# Patient Record
Sex: FEMALE | Race: WHITE | HISPANIC OR LATINO | Employment: UNEMPLOYED | URBAN - METROPOLITAN AREA
[De-identification: names, ages, dates, MRNs, and addresses within clinical notes are randomized per-mention and may not be internally consistent; named-entity substitution may affect disease eponyms.]

---

## 2018-04-16 PROBLEM — K14.8 TONGUE MASS: Status: ACTIVE | Noted: 2018-04-16

## 2018-04-16 PROBLEM — M25.472 EDEMA OF BOTH ANKLES: Status: ACTIVE | Noted: 2018-04-16

## 2018-04-16 PROBLEM — M25.471 EDEMA OF BOTH ANKLES: Status: ACTIVE | Noted: 2018-04-16

## 2018-04-16 PROBLEM — Z00.00 ENCOUNTER FOR PREVENTIVE CARE: Status: ACTIVE | Noted: 2018-04-16

## 2018-04-16 PROBLEM — K31.84 GASTROPARESIS: Status: ACTIVE | Noted: 2018-04-16

## 2018-04-16 PROBLEM — J45.20 MILD INTERMITTENT ASTHMA WITHOUT COMPLICATION: Status: ACTIVE | Noted: 2018-04-16

## 2018-06-11 ENCOUNTER — OFFICE VISIT (OUTPATIENT)
Dept: URGENT CARE | Age: 38
End: 2018-06-11
Payer: COMMERCIAL

## 2018-06-11 VITALS
SYSTOLIC BLOOD PRESSURE: 117 MMHG | WEIGHT: 154 LBS | HEART RATE: 76 BPM | OXYGEN SATURATION: 100 % | RESPIRATION RATE: 18 BRPM | BODY MASS INDEX: 26.29 KG/M2 | HEIGHT: 64 IN | DIASTOLIC BLOOD PRESSURE: 72 MMHG | TEMPERATURE: 97.9 F

## 2018-06-11 DIAGNOSIS — J45.909 MODERATE ASTHMA, UNSPECIFIED WHETHER COMPLICATED, UNSPECIFIED WHETHER PERSISTENT: Primary | ICD-10-CM

## 2018-06-11 DIAGNOSIS — S93.402A SPRAIN OF LEFT ANKLE, UNSPECIFIED LIGAMENT, INITIAL ENCOUNTER: ICD-10-CM

## 2018-06-11 DIAGNOSIS — J45.901 ASTHMA WITH ACUTE EXACERBATION, UNSPECIFIED ASTHMA SEVERITY, UNSPECIFIED WHETHER PERSISTENT: Primary | ICD-10-CM

## 2018-06-11 PROCEDURE — 99203 OFFICE O/P NEW LOW 30 MIN: CPT | Performed by: PHYSICIAN ASSISTANT

## 2018-06-11 PROCEDURE — S9088 SERVICES PROVIDED IN URGENT: HCPCS | Performed by: PHYSICIAN ASSISTANT

## 2018-06-11 RX ORDER — PREDNISONE 10 MG/1
20 TABLET ORAL 2 TIMES DAILY WITH MEALS
Qty: 20 TABLET | Refills: 0 | Status: SHIPPED | OUTPATIENT
Start: 2018-06-11 | End: 2018-06-16

## 2018-06-11 RX ORDER — ALBUTEROL SULFATE 90 UG/1
2 AEROSOL, METERED RESPIRATORY (INHALATION) EVERY 6 HOURS PRN
Qty: 18 G | Refills: 0 | Status: SHIPPED | OUTPATIENT
Start: 2018-06-11

## 2018-06-11 NOTE — PATIENT INSTRUCTIONS
Take prednisone as prescribed  Use albuterol inhaler every 4 hours  Tylenol 500mg over the counter for pain  Ice, rest and elevation of L ankle  Follow up with orthopedic specialist  Follow up with PCP in 3-5 days  Proceed to  ER if symptoms worsen  Bronchospasm   WHAT YOU NEED TO KNOW:   Bronchospasm is a narrowing of the airway that usually comes and goes  You may be at risk for bronchospasm if you have a chest cold or allergies  You may also be at risk if you are bothered by air pollution, certain medicines, cold, dry air, smoke, or strong odors  Exercise may worsen your symptoms  Bronchospasms may make it hard for you to breathe  DISCHARGE INSTRUCTIONS:   Medicines: You may need any of the following:  · Bronchodilators  help expand your airway for easier breathing  Some of these medicines may help prevent future spasms  · Inhaled steroids  help reduce swelling in your airway and soothe your breathing  These are used for long-term control  · Anticholinergics  help relax and open your airway  · Take your medicine as directed  Contact your healthcare provider if you think your medicine is not helping or if you have side effects  Tell him of her if you are allergic to any medicine  Keep a list of the medicines, vitamins, and herbs you take  Include the amounts, and when and why you take them  Bring the list or the pill bottles to follow-up visits  Carry your medicine list with you in case of an emergency  Follow up with your healthcare provider as directed: You may need more tests to find the cause of your condition  Write down your questions so you remember to ask them during your visits  Self-care:   · Avoid triggers  · Warm up before you exercise  Ask your healthcare provider about the best exercise plan for you  · Try to avoid people who are sick  Ask your healthcare provider if you need a flu or pneumonia vaccine       · Breathe through your nose when you are in cold, dry air or weather  This may help reduce lung irritation by warming the air before it reaches your lungs  Contact your healthcare provider if:   · You have a fever  · You have a cough that will not go away  · Your wheezing worsens  · You have questions or concerns about your condition or care  Return to the emergency department if:   · You cough or spit up blood  · You have trouble breathing  · You have blue fingernails or toenails  · You have chest pain  · You have a fast or uneven heartbeat  © 2017 [employer]  Information is for End User's use only and may not be sold, redistributed or otherwise used for commercial purposes  All illustrations and images included in CareNotes® are the copyrighted property of A [employer]  or Gabe Smyth  The above information is an  only  It is not intended as medical advice for individual conditions or treatments  Talk to your doctor, nurse or pharmacist before following any medical regimen to see if it is safe and effective for you

## 2018-06-11 NOTE — PROGRESS NOTES
[facility]        NAME: Marcelo Hickman is a 30 y o  female  : 1980    MRN: [id number]  DATE: 2018  TIME: 3:26 PM    Assessment and Plan   Asthma with acute exacerbation, unspecified asthma severity, unspecified whether persistent [J45 901]  1  Asthma with acute exacerbation, unspecified asthma severity, unspecified whether persistent  albuterol (PROAIR HFA) 90 mcg/act inhaler    predniSONE 10 mg tablet   2  Sprain of left ankle, unspecified ligament, initial encounter       ACE wrap applied to L ankle in office  Patient was offered but declined an x-ray, stating she recently received one without evidence of fracture  Patient was instructed to follow up with orthopedic specialist given length of injury  Wells score 0  Patient Instructions     Take prednisone as prescribed  Tylenol 500mg over the counter  Follow up with orthopedic specialist  Use albuterol inhaler every 4 hours  Follow up with PCP in 3-5 days  Proceed to  ER if symptoms worsen  Chief Complaint     Chief Complaint   Patient presents with    Shortness of Breath     Pt reports feeling SOB since yesterday  Pt has hx of asthma  Pt called PCP but only rec'd answering machine and no one called her back  History of Present Illness       States that her PCP was supposed to prescribe an inhaler 2 weeks ago but hasn't  Previous hospitalization for asthma "so long ago that I can't remember, maybe 6 years ago"  Admits to additional L ankle pain x 3 years  States she has had multiple x-rays (last=3 months ago) and ultrasounds without fracture or evidence of blood clot  States the pain is worse with walking and she doesn't feel like her primary care doctor has been following up as closely as she would prefer  Shortness of Breath   This is a new problem  The current episode started yesterday (Began 2 weeks ago with worsening yesterday  )  The problem occurs constantly  The problem has been gradually worsening  Associated symptoms include a sore throat and wheezing  Pertinent negatives include no abdominal pain, chest pain, claudication, coryza, ear pain, fever, headaches, hemoptysis, leg pain, leg swelling, neck pain, orthopnea, PND, rash, rhinorrhea, sputum production, swollen glands, syncope or vomiting  Nothing aggravates the symptoms  Associated symptoms comments: Chills    The patient has no known risk factors for DVT/PE  She has tried nothing for the symptoms  The treatment provided no relief  Her past medical history is significant for asthma  There is no history of allergies, aspirin allergies, bronchiolitis, CAD, chronic lung disease, COPD, DVT, a heart failure, PE, pneumonia or a recent surgery  Review of Systems   Review of Systems   Constitutional: Negative for activity change, appetite change, chills, fatigue and fever  HENT: Positive for sore throat  Negative for congestion, ear discharge, ear pain, postnasal drip, rhinorrhea, sinus pain, sinus pressure, sneezing and trouble swallowing  Eyes: Negative for discharge, redness and itching  Respiratory: Positive for cough, shortness of breath and wheezing  Negative for hemoptysis, sputum production and chest tightness  Cardiovascular: Negative for chest pain, palpitations, orthopnea, claudication, leg swelling, syncope and PND  Gastrointestinal: Negative for abdominal pain, blood in stool, diarrhea, nausea and vomiting  Genitourinary: Negative for dysuria, flank pain, frequency and urgency  Musculoskeletal: Negative for myalgias and neck pain  Skin: Negative for rash  Neurological: Negative for dizziness, light-headedness and headaches           Current Medications       Current Outpatient Prescriptions:     albuterol (PROAIR HFA) 90 mcg/act inhaler, Inhale 2 puffs every 6 (six) hours as needed for wheezing, Disp: 18 g, Rfl: 0    albuterol (PROVENTIL HFA,VENTOLIN HFA) 90 mcg/act inhaler, Inhale 2 puffs every 6 (six) hours as needed for wheezing, Disp: , Rfl:     metoclopramide (REGLAN) 5 mg tablet, Take 1 tablet (5 mg total) by mouth 4 (four) times a day, Disp: 30 tablet, Rfl: 5    predniSONE 10 mg tablet, Take 2 tablets (20 mg total) by mouth 2 (two) times a day with meals for 5 days, Disp: 20 tablet, Rfl: 0    Current Allergies     Allergies as of 06/11/2018    (No Known Allergies)            The following portions of the patient's history were reviewed and updated as appropriate: allergies, current medications, past family history, past medical history, past social history, past surgical history and problem list      Past Medical History:   Diagnosis Date    Asthma        Past Surgical History:   Procedure Laterality Date    TUBAL LIGATION  05/13/2012       Family History   Problem Relation Age of Onset    Heart disease Mother     Heart disease Father     Diabetes Paternal Grandmother     Diabetes Maternal Uncle          Medications have been verified  Objective   /72   Pulse 76   Temp 97 9 °F (36 6 °C)   Resp 18   Ht 5' 4" (1 626 m)   Wt 69 9 kg (154 lb)   SpO2 100%   BMI 26 43 kg/m²        Physical Exam     Physical Exam   Constitutional: She is oriented to person, place, and time  She appears well-developed and well-nourished  No distress  HENT:   Head: Normocephalic and atraumatic  Right Ear: External ear normal    Left Ear: External ear normal    Mouth/Throat: Oropharynx is clear and moist  No oropharyngeal exudate  Eyes: Conjunctivae are normal  Right eye exhibits no discharge  Left eye exhibits no discharge  Cardiovascular: Normal rate, regular rhythm, normal heart sounds and intact distal pulses  Exam reveals no gallop and no friction rub  No murmur heard  Pulmonary/Chest: Effort normal and breath sounds normal  No respiratory distress  She has no wheezes  She has no rales  She exhibits no tenderness  Abdominal: Soft  There is no tenderness  Musculoskeletal: Normal range of motion  Tenderness to palpation anterior to L lateral malleolus with mild swelling  Pain with L ankle eversion  MS 5/5 LE bilaterally  Lymphadenopathy:     She has no cervical adenopathy  Neurological: She is alert and oriented to person, place, and time  Light touch sensation intact  Skin: Skin is warm  No rash noted  Psychiatric: She has a normal mood and affect  Her behavior is normal  Judgment and thought content normal    Vitals reviewed

## 2018-06-12 RX ORDER — ALBUTEROL SULFATE 90 UG/1
2 AEROSOL, METERED RESPIRATORY (INHALATION) EVERY 6 HOURS PRN
Qty: 18 G | Refills: 5 | OUTPATIENT
Start: 2018-06-12

## 2018-08-01 ENCOUNTER — OFFICE VISIT (OUTPATIENT)
Dept: FAMILY MEDICINE CLINIC | Facility: CLINIC | Age: 38
End: 2018-08-01
Payer: COMMERCIAL

## 2018-08-01 VITALS
TEMPERATURE: 98.1 F | DIASTOLIC BLOOD PRESSURE: 68 MMHG | HEART RATE: 86 BPM | WEIGHT: 148 LBS | OXYGEN SATURATION: 97 % | HEIGHT: 64 IN | BODY MASS INDEX: 25.27 KG/M2 | SYSTOLIC BLOOD PRESSURE: 110 MMHG

## 2018-08-01 DIAGNOSIS — R10.13 EPIGASTRIC PAIN: Primary | ICD-10-CM

## 2018-08-01 DIAGNOSIS — L30.4 INTERTRIGO: ICD-10-CM

## 2018-08-01 DIAGNOSIS — K31.84 GASTROPARESIS: ICD-10-CM

## 2018-08-01 DIAGNOSIS — H69.82 DYSFUNCTION OF LEFT EUSTACHIAN TUBE: ICD-10-CM

## 2018-08-01 PROBLEM — H69.92 DYSFUNCTION OF LEFT EUSTACHIAN TUBE: Status: ACTIVE | Noted: 2018-08-01

## 2018-08-01 LAB
ALBUMIN SERPL BCP-MCNC: 4 G/DL (ref 3.5–5)
ALP SERPL-CCNC: 87 U/L (ref 46–116)
ALT SERPL W P-5'-P-CCNC: 18 U/L (ref 12–78)
ANION GAP SERPL CALCULATED.3IONS-SCNC: 7 MMOL/L (ref 4–13)
AST SERPL W P-5'-P-CCNC: 11 U/L (ref 5–45)
BASOPHILS # BLD AUTO: 0.02 THOUSANDS/ΜL (ref 0–0.1)
BASOPHILS NFR BLD AUTO: 0 % (ref 0–1)
BILIRUB SERPL-MCNC: 0.46 MG/DL (ref 0.2–1)
BUN SERPL-MCNC: 12 MG/DL (ref 5–25)
CALCIUM SERPL-MCNC: 9.4 MG/DL (ref 8.3–10.1)
CHLORIDE SERPL-SCNC: 105 MMOL/L (ref 100–108)
CO2 SERPL-SCNC: 26 MMOL/L (ref 21–32)
CREAT SERPL-MCNC: 0.84 MG/DL (ref 0.6–1.3)
EOSINOPHIL # BLD AUTO: 0.04 THOUSAND/ΜL (ref 0–0.61)
EOSINOPHIL NFR BLD AUTO: 1 % (ref 0–6)
ERYTHROCYTE [DISTWIDTH] IN BLOOD BY AUTOMATED COUNT: 11.9 % (ref 11.6–15.1)
GFR SERPL CREATININE-BSD FRML MDRD: 90 ML/MIN/1.73SQ M
GLUCOSE SERPL-MCNC: 82 MG/DL (ref 65–140)
HCT VFR BLD AUTO: 41.5 % (ref 34.8–46.1)
HGB BLD-MCNC: 13.9 G/DL (ref 11.5–15.4)
IMM GRANULOCYTES # BLD AUTO: 0.01 THOUSAND/UL (ref 0–0.2)
IMM GRANULOCYTES NFR BLD AUTO: 0 % (ref 0–2)
LIPASE SERPL-CCNC: 160 U/L (ref 73–393)
LYMPHOCYTES # BLD AUTO: 2.24 THOUSANDS/ΜL (ref 0.6–4.47)
LYMPHOCYTES NFR BLD AUTO: 38 % (ref 14–44)
MCH RBC QN AUTO: 29.1 PG (ref 26.8–34.3)
MCHC RBC AUTO-ENTMCNC: 33.5 G/DL (ref 31.4–37.4)
MCV RBC AUTO: 87 FL (ref 82–98)
MONOCYTES # BLD AUTO: 0.43 THOUSAND/ΜL (ref 0.17–1.22)
MONOCYTES NFR BLD AUTO: 7 % (ref 4–12)
NEUTROPHILS # BLD AUTO: 3.15 THOUSANDS/ΜL (ref 1.85–7.62)
NEUTS SEG NFR BLD AUTO: 54 % (ref 43–75)
NRBC BLD AUTO-RTO: 0 /100 WBCS
PLATELET # BLD AUTO: 268 THOUSANDS/UL (ref 149–390)
PMV BLD AUTO: 11 FL (ref 8.9–12.7)
POTASSIUM SERPL-SCNC: 3.9 MMOL/L (ref 3.5–5.3)
PROT SERPL-MCNC: 7.6 G/DL (ref 6.4–8.2)
RBC # BLD AUTO: 4.78 MILLION/UL (ref 3.81–5.12)
SODIUM SERPL-SCNC: 138 MMOL/L (ref 136–145)
TSH SERPL DL<=0.05 MIU/L-ACNC: 2.45 UIU/ML (ref 0.36–3.74)
WBC # BLD AUTO: 5.89 THOUSAND/UL (ref 4.31–10.16)

## 2018-08-01 PROCEDURE — 85025 COMPLETE CBC W/AUTO DIFF WBC: CPT | Performed by: PHYSICIAN ASSISTANT

## 2018-08-01 PROCEDURE — 99214 OFFICE O/P EST MOD 30 MIN: CPT | Performed by: PHYSICIAN ASSISTANT

## 2018-08-01 PROCEDURE — 84443 ASSAY THYROID STIM HORMONE: CPT | Performed by: PHYSICIAN ASSISTANT

## 2018-08-01 PROCEDURE — 83690 ASSAY OF LIPASE: CPT | Performed by: PHYSICIAN ASSISTANT

## 2018-08-01 PROCEDURE — 36415 COLL VENOUS BLD VENIPUNCTURE: CPT | Performed by: PHYSICIAN ASSISTANT

## 2018-08-01 PROCEDURE — 80053 COMPREHEN METABOLIC PANEL: CPT | Performed by: PHYSICIAN ASSISTANT

## 2018-08-01 RX ORDER — METOCLOPRAMIDE 5 MG/1
5 TABLET ORAL 4 TIMES DAILY
Qty: 120 TABLET | Refills: 5 | Status: SHIPPED | OUTPATIENT
Start: 2018-08-01 | End: 2019-09-17

## 2018-08-01 RX ORDER — FLUTICASONE PROPIONATE 50 MCG
1 SPRAY, SUSPENSION (ML) NASAL DAILY
Qty: 16 G | Refills: 5 | Status: SHIPPED | OUTPATIENT
Start: 2018-08-01 | End: 2018-09-20 | Stop reason: SINTOL

## 2018-08-01 RX ORDER — KETOCONAZOLE 20 MG/G
CREAM TOPICAL DAILY
Qty: 15 G | Refills: 3 | Status: SHIPPED | OUTPATIENT
Start: 2018-08-01

## 2018-08-01 NOTE — PROGRESS NOTES
Assessment/Plan:    Gastroparesis  Start reglan QID 15 minutes prior to meals  Instructed to eat small meals low in fat and fiber    Epigastric pain  Likely 2/2 gastroparesis  Will check CBC, CMP, lipase, TSH    Intertrigo  Ketoconazole cream prescribed    Dysfunction of left eustachian tube  flonase prescribed one spray per nostril daily       Diagnoses and all orders for this visit:    Epigastric pain  -     CBC and differential  -     Comprehensive metabolic panel  -     TSH, 3rd generation with Free T4 reflex  -     Lipase    Gastroparesis  -     metoclopramide (REGLAN) 5 mg tablet; Take 1 tablet (5 mg total) by mouth 4 (four) times a day    Dysfunction of left eustachian tube  -     fluticasone (FLONASE) 50 mcg/act nasal spray; 1 spray into each nostril daily    Intertrigo  -     ketoconazole (NIZORAL) 2 % cream; Apply topically daily          Subjective:      Patient ID: Gay Baptiste is a 30 y o  female  31 y/o F hx gastroparesis presents c/o abdominal pain, n/v for several days as well as L ear pressure and rash  Abdominal sx similar to gastroparesis in the past  reglan was rxed at last visit but she reports pharmacy never received it  Pt did not have labs performed that I had ordered after last visit  No fever, chills, weight loss  Pt has also been experiencing L ear pressure  No fever, chills, decreased hearing, dizziness, tinnitus, nasal congestion  Did have white spots on the throat but these resolved  Also has rash under breasts that is pruritic        The following portions of the patient's history were reviewed and updated as appropriate: allergies, current medications, past family history, past medical history, past social history, past surgical history and problem list     Review of Systems   Constitutional: Negative for diaphoresis, fatigue and fever  HENT: Positive for ear pain  Negative for congestion, hearing loss, postnasal drip, rhinorrhea and sore throat      Eyes: Negative for pain, redness and visual disturbance  Respiratory: Negative for cough, shortness of breath and wheezing  Cardiovascular: Negative for chest pain, palpitations and leg swelling  Gastrointestinal: Positive for abdominal pain, nausea and vomiting  Negative for anal bleeding, constipation and diarrhea  Endocrine: Negative for polydipsia and polyuria  Genitourinary: Negative for dysuria, flank pain and hematuria  Musculoskeletal: Negative for arthralgias, back pain, joint swelling and myalgias  Skin: Positive for rash  Negative for pallor and wound  Neurological: Positive for headaches  Negative for dizziness, syncope and numbness  Hematological: Negative for adenopathy  Does not bruise/bleed easily  Psychiatric/Behavioral: Negative for dysphoric mood and sleep disturbance  The patient is not nervous/anxious  Objective:      /68   Pulse 86   Temp 98 1 °F (36 7 °C)   Ht 5' 4" (1 626 m)   Wt 67 1 kg (148 lb)   LMP 07/26/2018 (Exact Date)   SpO2 97%   BMI 25 40 kg/m²          Physical Exam   Constitutional: She is oriented to person, place, and time  She appears well-developed and well-nourished  No distress  HENT:   Head: Normocephalic and atraumatic  Right Ear: Hearing, tympanic membrane, external ear and ear canal normal    Left Ear: Hearing, tympanic membrane, external ear and ear canal normal    Mouth/Throat: Oropharynx is clear and moist    Eyes: Conjunctivae and EOM are normal  Pupils are equal, round, and reactive to light  Right eye exhibits no discharge  Left eye exhibits no discharge  No scleral icterus  Neck: Normal range of motion  Neck supple  No thyromegaly present  Cardiovascular: Normal rate, regular rhythm and normal heart sounds  Exam reveals no gallop and no friction rub  No murmur heard  Pulmonary/Chest: Effort normal and breath sounds normal  No respiratory distress  She has no wheezes  She has no rales  Abdominal: Soft   She exhibits no distension and no mass  There is tenderness (epigastric)  There is no rebound and no guarding  Musculoskeletal: Normal range of motion  She exhibits no edema  Lymphadenopathy:     She has no cervical adenopathy  Neurological: She is alert and oriented to person, place, and time  No cranial nerve deficit  Skin: Skin is warm and dry  No rash noted  She is not diaphoretic  No erythema  No pallor     Erythematous rash under skin folds of breasts

## 2018-08-02 ENCOUNTER — TELEPHONE (OUTPATIENT)
Dept: FAMILY MEDICINE CLINIC | Facility: CLINIC | Age: 38
End: 2018-08-02

## 2018-08-02 NOTE — TELEPHONE ENCOUNTER
----- Message from Claire Petty PA-C sent at 8/2/2018  7:43 AM EDT -----  Please tell pt blood work was normal

## 2018-08-06 ENCOUNTER — TELEPHONE (OUTPATIENT)
Dept: FAMILY MEDICINE CLINIC | Facility: CLINIC | Age: 38
End: 2018-08-06

## 2018-08-06 NOTE — TELEPHONE ENCOUNTER
Patient called stating that she would like to have the Ketoconazole tablets called into [facility]  Please advise   Thanks

## 2018-08-13 ENCOUNTER — TELEPHONE (OUTPATIENT)
Dept: FAMILY MEDICINE CLINIC | Facility: CLINIC | Age: 38
End: 2018-08-13

## 2018-08-13 NOTE — TELEPHONE ENCOUNTER
----- Message from Oj Marquez PA-C sent at 8/6/2018  1:37 PM EDT -----  Fungal infections take several weeks to clear   She should continue the cream as prescribed, it is too early to note significant affect and the ketoconazole tablets would be too hard on her GI tract given she already has GI issues

## 2018-08-14 NOTE — TELEPHONE ENCOUNTER
Practice admin sp/w patient and reiterated info regarding the tablets  Patient stated the cream is working but she lost it on vacation  Admin told her a new RX would be sent to pharmacy  Pt OK and understood

## 2018-09-06 ENCOUNTER — TELEPHONE (OUTPATIENT)
Dept: OBGYN CLINIC | Facility: CLINIC | Age: 38
End: 2018-09-06

## 2018-09-06 NOTE — TELEPHONE ENCOUNTER
Called pt,left message on voicemail  has new pt appt for painful sex  Has [facility] and will need a referral from PCP to be seen for this appt

## 2018-09-20 ENCOUNTER — OFFICE VISIT (OUTPATIENT)
Dept: FAMILY MEDICINE CLINIC | Facility: CLINIC | Age: 38
End: 2018-09-20
Payer: COMMERCIAL

## 2018-09-20 VITALS
DIASTOLIC BLOOD PRESSURE: 80 MMHG | SYSTOLIC BLOOD PRESSURE: 110 MMHG | WEIGHT: 148 LBS | TEMPERATURE: 97.1 F | OXYGEN SATURATION: 99 % | HEIGHT: 64 IN | HEART RATE: 63 BPM | BODY MASS INDEX: 25.27 KG/M2

## 2018-09-20 DIAGNOSIS — R07.1 CHEST PAIN VARYING WITH BREATHING: ICD-10-CM

## 2018-09-20 DIAGNOSIS — R10.13 EPIGASTRIC PAIN: Primary | ICD-10-CM

## 2018-09-20 PROBLEM — Z00.00 ENCOUNTER FOR PREVENTIVE CARE: Status: RESOLVED | Noted: 2018-04-16 | Resolved: 2018-09-20

## 2018-09-20 PROCEDURE — 3008F BODY MASS INDEX DOCD: CPT | Performed by: PHYSICIAN ASSISTANT

## 2018-09-20 PROCEDURE — 99213 OFFICE O/P EST LOW 20 MIN: CPT | Performed by: PHYSICIAN ASSISTANT

## 2018-09-20 RX ORDER — PANTOPRAZOLE SODIUM 40 MG/1
40 TABLET, DELAYED RELEASE ORAL DAILY
Qty: 90 TABLET | Refills: 0 | Status: SHIPPED | OUTPATIENT
Start: 2018-09-20 | End: 2019-09-17 | Stop reason: SDUPTHER

## 2018-09-20 NOTE — PROGRESS NOTES
Assessment/Plan:    Epigastric pain  Recurrent issue for the patient with all normal labs in the past  Reports currently feels like the stomach ulcer she has had in the past which was negative for h pylori  Will start protonix 40 mg daily which she should take for at least 8 weeks  Will refer to GI at this point and with hx gastroparesis  Will order US liver as well with what appears to be referred pain to the right shoulder especially with deep breaths, LFTs have been normal  She should go to the ER should pain become severe or accompanied by diaphoresis, chest pain, SOB, n/v, radiation to extremities or jaw       Diagnoses and all orders for this visit:    Epigastric pain  -     pantoprazole (PROTONIX) 40 mg tablet; Take 1 tablet (40 mg total) by mouth daily  -     Ambulatory referral to Gastroenterology; Future  -     US abdomen limited; Future    Chest pain varying with breathing  -     US abdomen limited; Future          Subjective:      Patient ID: Catalina Lujan is a 30 y o  female  31-year-old female with history of gastroparesis presents complaining of epigastric pain for 1 week  She reports that it is worsening  It feels like a burning sensation and feel similar to the pain she had when she had a gastric ulcer in the past   Reports biopsies were taken at the time were negative for H pylori  Her nausea and vomiting from gastroparesis have resolved since the addition of Reglan  She denies fever, chills, change in bowel habits, dark or tarry stools, blood in stool, weight change, difficulty swallowing, recent travel  Last bowel movement was 2 days ago which is normal for her  She is currently having menses  No shortness of breath but notes when she takes deep breaths she has pain radiate to the right shoulder  Blood work for similar complaints when months ago including CBC, CMP, TSH and lipase were all within normal limits    Vitals all stable on exam  No alleviative therapies attempted        The following portions of the patient's history were reviewed and updated as appropriate: allergies, current medications, past family history, past medical history, past social history, past surgical history and problem list     Review of Systems   Constitutional: Negative for chills, fatigue and fever  Respiratory: Negative for cough and shortness of breath  Cardiovascular: Negative for chest pain and leg swelling  Gastrointestinal: Positive for abdominal pain  Negative for blood in stool, constipation, diarrhea, nausea and vomiting  Musculoskeletal: Positive for arthralgias  Neurological: Negative for light-headedness  Objective:      /80   Pulse 63   Temp (!) 97 1 °F (36 2 °C)   Ht 5' 4" (1 626 m)   Wt 67 1 kg (148 lb)   SpO2 99%   BMI 25 40 kg/m²          Physical Exam   Constitutional: She is oriented to person, place, and time  She appears well-developed and well-nourished  No distress  HENT:   Head: Normocephalic and atraumatic  Mouth/Throat: Oropharynx is clear and moist    Eyes: Conjunctivae and EOM are normal  Pupils are equal, round, and reactive to light  Right eye exhibits no discharge  Left eye exhibits no discharge  No scleral icterus  Neck: Normal range of motion  Neck supple  No thyromegaly present  Cardiovascular: Normal rate, regular rhythm and normal heart sounds  Exam reveals no gallop and no friction rub  No murmur heard  Pulmonary/Chest: Effort normal and breath sounds normal  No respiratory distress  She has no wheezes  She has no rales  Abdominal: Soft  Bowel sounds are normal  She exhibits no distension and no mass  There is tenderness (epigastric)  There is no rebound and no guarding  Musculoskeletal: Normal range of motion  She exhibits no edema  Lymphadenopathy:     She has no cervical adenopathy  Neurological: She is alert and oriented to person, place, and time  No cranial nerve deficit  Skin: Skin is warm and dry  No rash noted   She is not diaphoretic  No erythema  No pallor

## 2018-09-20 NOTE — ASSESSMENT & PLAN NOTE
Recurrent issue for the patient with all normal labs in the past  Reports currently feels like the stomach ulcer she has had in the past which was negative for h pylori  Will start protonix 40 mg daily which she should take for at least 8 weeks  Will refer to GI at this point and with hx gastroparesis   Will order US liver as well with what appears to be referred pain to the right shoulder especially with deep breaths, LFTs have been normal  She should go to the ER should pain become severe or accompanied by diaphoresis, chest pain, SOB, n/v, radiation to extremities or jaw

## 2018-09-24 ENCOUNTER — TELEPHONE (OUTPATIENT)
Dept: GASTROENTEROLOGY | Facility: MEDICAL CENTER | Age: 38
End: 2018-09-24

## 2018-10-24 ENCOUNTER — OFFICE VISIT (OUTPATIENT)
Dept: OBGYN CLINIC | Facility: CLINIC | Age: 38
End: 2018-10-24
Payer: COMMERCIAL

## 2018-10-24 VITALS
DIASTOLIC BLOOD PRESSURE: 71 MMHG | WEIGHT: 152.6 LBS | BODY MASS INDEX: 26.19 KG/M2 | HEART RATE: 81 BPM | SYSTOLIC BLOOD PRESSURE: 104 MMHG

## 2018-10-24 DIAGNOSIS — Z01.419 ENCOUNTER FOR ANNUAL ROUTINE GYNECOLOGICAL EXAMINATION: Primary | ICD-10-CM

## 2018-10-24 PROCEDURE — 99385 PREV VISIT NEW AGE 18-39: CPT | Performed by: OBSTETRICS & GYNECOLOGY

## 2018-10-24 PROCEDURE — G0145 SCR C/V CYTO,THINLAYER,RESCR: HCPCS | Performed by: OBSTETRICS & GYNECOLOGY

## 2018-10-24 PROCEDURE — 87624 HPV HI-RISK TYP POOLED RSLT: CPT | Performed by: OBSTETRICS & GYNECOLOGY

## 2018-10-24 RX ORDER — METOCLOPRAMIDE HYDROCHLORIDE 5 MG/1
TABLET, ORALLY DISINTEGRATING ORAL
COMMUNITY

## 2018-10-24 NOTE — PROGRESS NOTES
Subjective      Ashley Goncalves is a 41 y o   female who presents for annual well woman exam  Patient reports that her "hormones are going crazy"  She states that she has had pimples around her hairline and that she has been having increased sweating prior to her menses  She also reports increased body odor under her arms and vaginally for which she has tried different deodorants  She reports dizziness that comes and goes throughout the day  She states that she is lightheaded when she bends over, but that she also feels flushed and dizzy randomly during the day  She also complains of cold hands, occasional heart palpitations, and her hair falling out  She denies weight changes  Patient has a history of gastroparesis and asthma for which she was seeing an advanced practitioner  She is planning to change providers at this time  GYN:  · Patient complains of vaginal odor that "smells worse than when you have your period"  She describes the smell as "sweaty" and "like dirty clothes"  · No vaginal discharge, labial erythema or lesions, dyspareunia  · Menarche at 17  · Menses are regular, q 28-30 days, lasting 5-7 days  No intermenstrual bleeding, spotting, or discharge  · Contraception: Bilateral tubal ligation  · Patient reports that she is not interested in having sex  She states that she likes her  but that she has had decreased interest since she gave birth in 2012  She reports that she is not interested in having sex with anyone else or masturbation  She reports that she is only sexually active with her partner  Patient reports that she has pain in some positions but not others  · Her last pap smear was in last year in April of 2017  Results were "normal"  · Patient denies other gynecologic surgeries  Patient was told that she has scar tissue by one doctor that was then contradicted by another doctor    OB:  · [id number] female  Her first 2 pregnancies were uncomplicated   Her most recent pregnancy was complicated by asthma exacerbation, gestational hypertension, low-lying placenta, and then precipitous delivery  :  · no dysuria, urinary frequency or urgency  · no hematuria, flank pain,   · No incontinence  Breast:  · No breast mass, skin changes, dimpling, reddening, nipple retraction  · No breast discharge  · Patient does not have a family history of breast, endometrial, or ovarian ca  General:  · Diet: gluten free, no acidic foods no onions, due to gastro, switches between healthy and eating unhealthy foods  Patient reports that she does not like fast food  · Exercise: not really due to dizziness and asthma  · Work: selling insurance for seniors  · ETOH use: no  · Tobacco use: no  · Recreational drug use: no  · She moved to the area in 2 months ago  Moved from [place] due to snakes and for work as her  owns a company  Screening:  · Cervical cancer: last pap smear in April 2017  Results were normal    · STD screening: Declined  Objective      /71 (BP Location: Left arm, Patient Position: Sitting, Cuff Size: Standard)   Pulse 81   Wt 69 2 kg (152 lb 9 6 oz)   BMI 26 19 kg/m²     Physical Exam   Constitutional: She is oriented to person, place, and time  She appears well-developed and well-nourished  No distress  Flushed   HENT:   Head: Normocephalic and atraumatic  Cardiovascular: Normal rate, regular rhythm and normal heart sounds  Exam reveals no gallop and no friction rub  No murmur heard  Pulmonary/Chest: Effort normal and breath sounds normal  No respiratory distress  She has no wheezes  She has no rales  Genitourinary: Uterus normal  No vaginal discharge found  Genitourinary Comments: Normal appearing vulva  Normal appearing vaginal mucosa  Cervix visualized: No lesions, bleeding, SCJ visualized  Freely mobile cervix without motion tenderness  Neurological: She is alert and oriented to person, place, and time     Skin: Skin is warm and dry  She is not diaphoretic  Psychiatric: She has a normal mood and affect  Her behavior is normal  Judgment and thought content normal      Assessment   42 yo [id number] presenting for well women exam with complaints of dizziness, decreased libido, increased body odor, and perspiration    Plan   1  Annual screening  - Pap performed  Will send for analysis and call patient with abnormal results  - Wet mount: Normal appearing epithelial cells; negative for hyphae, clue cells, trichomonads, or WBCs  2  Dizziness  - Encouraged follow-up with primary care provider   - Patient had CBC and TSH in 8/2018 that was wnl      3  Concern for decreased libido, increased body odor and perspiration  - May consider OCP for regular of hormonal cycle  - Will reassess when patient medically stable from dizziness     Ruth Bonds MD  OB/GYN  10/24/2018  3:31 PM  Pager: [phone]

## 2018-10-29 LAB
HPV HR 12 DNA CVX QL NAA+PROBE: NEGATIVE
HPV16 DNA CVX QL NAA+PROBE: NEGATIVE
HPV18 DNA CVX QL NAA+PROBE: NEGATIVE

## 2018-11-01 LAB
LAB AP GYN PRIMARY INTERPRETATION: NORMAL
Lab: NORMAL

## 2018-11-28 ENCOUNTER — OFFICE VISIT (OUTPATIENT)
Dept: GASTROENTEROLOGY | Facility: MEDICAL CENTER | Age: 38
End: 2018-11-28
Payer: COMMERCIAL

## 2018-11-28 VITALS
DIASTOLIC BLOOD PRESSURE: 64 MMHG | WEIGHT: 150 LBS | SYSTOLIC BLOOD PRESSURE: 118 MMHG | HEIGHT: 64 IN | TEMPERATURE: 98 F | BODY MASS INDEX: 25.61 KG/M2 | HEART RATE: 76 BPM

## 2018-11-28 DIAGNOSIS — K31.84 GASTROPARESIS: Primary | ICD-10-CM

## 2018-11-28 DIAGNOSIS — K59.04 CHRONIC IDIOPATHIC CONSTIPATION: ICD-10-CM

## 2018-11-28 DIAGNOSIS — R10.13 EPIGASTRIC PAIN: ICD-10-CM

## 2018-11-28 DIAGNOSIS — R11.2 NON-INTRACTABLE VOMITING WITH NAUSEA, UNSPECIFIED VOMITING TYPE: ICD-10-CM

## 2018-11-28 PROCEDURE — 99244 OFF/OP CNSLTJ NEW/EST MOD 40: CPT | Performed by: INTERNAL MEDICINE

## 2018-11-28 NOTE — PROGRESS NOTES
[facility] - Outpatient Consultation  Jefferson Griffin 41 y o  female MRN: [id number]  Encounter: [id number]      PCP: Ivory Ramos PA-C  Referring: Ivory Ramos PA-C  [address] [facility] [address] [place] [address]      ASSESSMENT AND PLAN:      1  Epigastric pain  2  Gastroparesis  3  Non-intractable vomiting with nausea, unspecified vomiting type  Personal history of gastroparesis, on long-term Reglan  I discussed the side effects including irreversible tardive dyskinesia with use of Reglan that extends past 12 weeks  I have written a prescription for 10 mg TID PO domperidone (#1000) to take before meals  I have offered her instructions to get this from [employer]  Will order repeat gastric emptying study, I have instructed her to get this off Reglan  We will schedule her for an EGD with gastric Botox, this is benefitted her symptoms in the past   I have offered her nutritional and dietary recommendations in the meantime to guide her dietary choices we with gastroparesis  - NM gastric emptying; Future  - Case request operating room: ESOPHAGOGASTRODUODENOSCOPY (EGD) with botox; Standing  - Case request operating room: ESOPHAGOGASTRODUODENOSCOPY (EGD) with botox    4  Chronic idiopathic constipation  She will use laxative tea, per her preference is does not exacerbate her gastroparesis symptoms  Do feel that constipation is contributing to her increased nausea and vomiting       ______________________________________________________________________    HPI:      Patient is a 61-year-old female referred for abdominal pain and history of gastroparesis  She has a past medical history of asthma, gastroparesis  She was previously being seen by Dr Suzanne Diaz and [facility], where she has undergone multiple gastric emptying studies have confirmed idiopathic gastroparesis  She has undergone EGD with Botox to the gastric antrum, with release testing for months    She has been off of her domperidone which has previously been prescribed by her doctor  She is currently taking Reglan 5 mg 4 times daily, before meals and at bedtime, without significant relief  She complains of daily nausea that occurs postprandially with development of thick saliva and eventual vomiting that leads to relief  She also complains of worsening constipation over the same time  She states she has not had a bowel movement and 1 5 weeks and is having worsening bloating  She usually uses Smooth Move tea that helps with her bowel movements, but has not been able to find in [facility] [address] [facility]  She is concerned about using medication for this as it worsens her gastroparesis  REVIEW OF SYSTEMS:    CONSTITUTIONAL: Denies any fever, chills, rigors, and weight loss  HEENT: No earache or tinnitus  Denies hearing loss or visual disturbances  CARDIOVASCULAR: No chest pain or palpitations  RESPIRATORY: Denies any cough, hemoptysis, shortness of breath or dyspnea on exertion  GASTROINTESTINAL: As noted in the History of Present Illness  GENITOURINARY: No problems with urination  Denies any hematuria or dysuria  NEUROLOGIC: No dizziness or vertigo, denies headaches  MUSCULOSKELETAL: Denies any muscle or joint pain  SKIN: Denies skin rashes or itching  ENDOCRINE: Denies excessive thirst  Denies intolerance to heat or cold  PSYCHOSOCIAL: Denies depression or anxiety  Denies any recent memory loss         Historical Information   Past Medical History:   Diagnosis Date    Asthma     Colon polyp     Gastroparesis      Past Surgical History:   Procedure Laterality Date    COLONOSCOPY      TUBAL LIGATION  05/13/2012    UPPER GASTROINTESTINAL ENDOSCOPY       Social History   History   Alcohol Use No     History   Drug Use No     History   Smoking Status    Never Smoker   Smokeless Tobacco    Never Used     Family History   Problem Relation Age of Onset    Heart disease Mother     Heart disease Father     Diabetes Paternal Grandmother     Diabetes Maternal Uncle        Meds/Allergies       Current Outpatient Prescriptions:     albuterol (PROAIR HFA) 90 mcg/act inhaler    ketoconazole (NIZORAL) 2 % cream    metoclopramide (REGLAN) 5 mg tablet    Metoclopramide HCl 5 MG TBDP    pantoprazole (PROTONIX) 40 mg tablet    No Known Allergies        Objective     Blood pressure 118/64, pulse 76, temperature 98 °F (36 7 °C), temperature source Tympanic, height 5' 4" (1 626 m), weight 68 kg (150 lb)  Body mass index is 25 75 kg/m²  PHYSICAL EXAM:      General Appearance:   Alert, cooperative, no distress   HEENT:   Normocephalic, atraumatic, anicteric      Neck:  Supple, symmetrical, trachea midline   Lungs:   Clear to auscultation bilaterally; no rales, rhonchi or wheezing; respirations unlabored    Heart[de-identified]   Regular rate and rhythm; no murmur, rub, or gallop  Abdomen:   Soft, + epigastric tenderness, non-distended; normal bowel sounds; no masses, no organomegaly    Genitalia:   Deferred    Rectal:   Deferred    Extremities:  No cyanosis, clubbing or edema    Pulses:  2+ and symmetric    Skin:  No jaundice, rashes, or lesions    Lymph nodes:  No palpable cervical lymphadenopathy        Lab Results:     Lab Results   Component Value Date    WBC 5 89 08/01/2018    HGB 13 9 08/01/2018    HCT 41 5 08/01/2018    MCV 87 08/01/2018     08/01/2018       Lab Results   Component Value Date    K 3 9 08/01/2018     08/01/2018    CO2 26 08/01/2018    BUN 12 08/01/2018    CREATININE 0 84 08/01/2018    CALCIUM 9 4 08/01/2018    AST 11 08/01/2018    ALT 18 08/01/2018    ALKPHOS 87 08/01/2018    EGFR 90 08/01/2018       No results found for: INR, PROTIME      Radiology Results:   No results found

## 2018-11-28 NOTE — PATIENT INSTRUCTIONS
NUTRITION RECOMMENDATIONS FOR PATIENTS WITH DELAYED GASTRIC EMPTYING **    Nutrition education and diet modifications are important factors for controlling the symptoms of chronic nausea and vomiting associated with impaired stomach emptying  Maintaining good nutrition can be achieved with modest changes in your diet while at the same time help to reduce symptoms  Basic Points to Remember:    1  Eat smaller portions  The greater the meal volume, the slower the stomach empties  Eat smaller meals more frequently for easier digestion  2  Sit up or stand during eating and after meals  Body positioning during meals is very important  Avoid lying down while eating  Try to sit up or stand and walk around during and at least 3 hours after meals  3  Choose liquid or pureed foods should empty even on a bad day  Liquid food exits the stomach more easily than solids  Switch to a liquid diet or pureed/ground foods if necessary  The same thing can be accomplished in most patients my thoroughly chewing your food  Drink caloric drinks rather than water (e g  Peach/pear nectar, cranberry juice, Gator Aide, soup broth, Jaya-Aid, etc )  4  Be aware of side effects from medications  Some medications may irritate the stomach or cause further delay of your stomach emptying  Either can lead to nausea and vomiting  Ask your doctor if you are currently taking any medications that may be causing chronic nausea and/or vomiting  5  If you have diabetes, the most important thing to do is to control your glucose levels  Elevated glucose levels >200 mg/dL can delay stomach emptying in healthy people  If you have diabetes, take frequent glucose measurements and make insulin adjustments as needed for glucose control  6  Avoid excess fiber intake  Some fiber is important to maintain normal intestinal functions  On the other hand, a high-fiber diet slows stomach emptying and increases the presence of food residue in the stomach   Avoid foods like oranges, berries, green beans, figs, skin on apples, potato peels, broccoli, cauliflower, and lettuce  Most fruits and vegetables can be digested successfully if they are smaller than 1/4 inch  Again, chewing and cutting up your food and eating with plenty of liquid is important  Avoid excess high-fiber supplements such as Metamucil, Citrucel, etc   7  Avoid high-fat foods  Fat slows the exit of food from the stomach  A low-fat diet is recommended; however, some liquid containing fat can be a good source of calories  8  Take a daily multi-vitamin supplement  A multi-vitamin supplement (specifically vitamins A and C, and the mineral iron) should be considered  9  Eat nutritious foods  Eat nutritiously before filling up on empty calories found in foods such as cakes, candy and pastries  10  Be aware of "trigger foods"  Some foods may cause symptoms more readily than others  Take note of these foods and avoid them if possible  Recommended Foods:    Skim milk, low-fat yogurt, low-fat milkshakes, low-fat cheeses, hot cereals (cream of wheat, grits)  Fat-free soups and bouillon with noodles and vegetables  Fruit juice, canned fruits without skin (applesauce, peaches, pears); add honey or syrup for extra calories  Eggs, peanut butter  Meats, fish, poultry; mix with broth, water, vegetable or V-8 juice  Low-grain bread and cereals, pasta, rice, crackers  Vegetable juices, cooked vegetables without skins (beets, carrots, mushrooms, potatoes, including mashed potatoes)  Tea, water, coffee, soda    ** These recommendations have been modified from a publication from the [employer]  It was written by the following authors:  Niraj Honeycutt     EGD scheduled on 1/17/2019 with Dr Aram Miguel at the [facility]  Instructions given to patient

## 2018-11-28 NOTE — LETTER
November 28, 2018     Daniel Briceno, [address] [address]    Patient: Chito Patricia   YOB: 1980   Date of Visit: 11/28/2018       Dear Ms Feroz Bradley: Thank you for referring Chito Patricia to me for evaluation  Below are my notes for this consultation  If you have questions, please do not hesitate to call me  I look forward to following your patient along with you  Sincerely,      MADAY German  [facility]  Mobile: [phone]  Available on [email or web address]  [email or web address]           CC: No Recipients  Rosendo Taylor MD  11/28/2018  1:03 PM  Sign at close encounter  [facility] - Outpatient Consultation  Chito Patricia 41 y o  female MRN: [id number]  Encounter: [id number]      PCP: Daniel Briceno PA-C  Referring: Daniel Briceno PA-C  [address] [place] [address]      ASSESSMENT AND PLAN:      1  Epigastric pain  2  Gastroparesis  3  Non-intractable vomiting with nausea, unspecified vomiting type  Personal history of gastroparesis, on long-term Reglan  I discussed the side effects including irreversible tardive dyskinesia with use of Reglan that extends past 12 weeks  I have written a prescription for 10 mg TID PO domperidone (#1000) to take before meals  I have offered her instructions to get this from [facility]  [facility]  Will order repeat gastric emptying study, I have instructed her to get this off Reglan  We will schedule her for an EGD with gastric Botox, this is benefitted her symptoms in the past   I have offered her nutritional and dietary recommendations in the meantime to guide her dietary choices we with gastroparesis  - NM gastric emptying; Future  - Case request operating room: ESOPHAGOGASTRODUODENOSCOPY (EGD) with botox; Standing  - Case request operating room: ESOPHAGOGASTRODUODENOSCOPY (EGD) with botox    4   Chronic idiopathic constipation  She will use laxative tea, per her preference is does not exacerbate her gastroparesis symptoms  Do feel that constipation is contributing to her increased nausea and vomiting       ______________________________________________________________________    HPI:      Patient is a 53-year-old female referred for abdominal pain and history of gastroparesis  She has a past medical history of asthma, gastroparesis  She was previously being seen by Dr Emili Angeles and [facility], where she has undergone multiple gastric emptying studies have confirmed idiopathic gastroparesis  She has undergone EGD with Botox to the gastric antrum, with release testing for months  She has been off of her domperidone which has previously been prescribed by her doctor  She is currently taking Reglan 5 mg 4 times daily, before meals and at bedtime, without significant relief  She complains of daily nausea that occurs postprandially with development of thick saliva and eventual vomiting that leads to relief  She also complains of worsening constipation over the same time  She states she has not had a bowel movement and 1 5 weeks and is having worsening bloating  She usually uses Smooth Move tea that helps with her bowel movements, but has not been able to find in [place]  She is concerned about using medication for this as it worsens her gastroparesis  REVIEW OF SYSTEMS:    CONSTITUTIONAL: Denies any fever, chills, rigors, and weight loss  HEENT: No earache or tinnitus  Denies hearing loss or visual disturbances  CARDIOVASCULAR: No chest pain or palpitations  RESPIRATORY: Denies any cough, hemoptysis, shortness of breath or dyspnea on exertion  GASTROINTESTINAL: As noted in the History of Present Illness  GENITOURINARY: No problems with urination  Denies any hematuria or dysuria  NEUROLOGIC: No dizziness or vertigo, denies headaches  MUSCULOSKELETAL: Denies any muscle or joint pain  SKIN: Denies skin rashes or itching     ENDOCRINE: Denies excessive thirst  Denies intolerance to heat or cold  PSYCHOSOCIAL: Denies depression or anxiety  Denies any recent memory loss  Historical Information   Past Medical History:   Diagnosis Date    Asthma     Colon polyp     Gastroparesis      Past Surgical History:   Procedure Laterality Date    COLONOSCOPY      TUBAL LIGATION  05/13/2012    UPPER GASTROINTESTINAL ENDOSCOPY       Social History   History   Alcohol Use No     History   Drug Use No     History   Smoking Status    Never Smoker   Smokeless Tobacco    Never Used     Family History   Problem Relation Age of Onset    Heart disease Mother     Heart disease Father     Diabetes Paternal Grandmother     Diabetes Maternal Uncle        Meds/Allergies       Current Outpatient Prescriptions:     albuterol (PROAIR HFA) 90 mcg/act inhaler    ketoconazole (NIZORAL) 2 % cream    metoclopramide (REGLAN) 5 mg tablet    Metoclopramide HCl 5 MG TBDP    pantoprazole (PROTONIX) 40 mg tablet    No Known Allergies        Objective     Blood pressure 118/64, pulse 76, temperature 98 °F (36 7 °C), temperature source Tympanic, height 5' 4" (1 626 m), weight 68 kg (150 lb)  Body mass index is 25 75 kg/m²  PHYSICAL EXAM:      General Appearance:   Alert, cooperative, no distress   HEENT:   Normocephalic, atraumatic, anicteric      Neck:  Supple, symmetrical, trachea midline   Lungs:   Clear to auscultation bilaterally; no rales, rhonchi or wheezing; respirations unlabored    Heart[de-identified]   Regular rate and rhythm; no murmur, rub, or gallop     Abdomen:   Soft, + epigastric tenderness, non-distended; normal bowel sounds; no masses, no organomegaly    Genitalia:   Deferred    Rectal:   Deferred    Extremities:  No cyanosis, clubbing or edema    Pulses:  2+ and symmetric    Skin:  No jaundice, rashes, or lesions    Lymph nodes:  No palpable cervical lymphadenopathy        Lab Results:     Lab Results   Component Value Date    WBC 5 89 08/01/2018    HGB 13 9 08/01/2018    HCT 41 5 08/01/2018    MCV 87 08/01/2018     08/01/2018       Lab Results   Component Value Date    K 3 9 08/01/2018     08/01/2018    CO2 26 08/01/2018    BUN 12 08/01/2018    CREATININE 0 84 08/01/2018    CALCIUM 9 4 08/01/2018    AST 11 08/01/2018    ALT 18 08/01/2018    ALKPHOS 87 08/01/2018    EGFR 90 08/01/2018       No results found for: INR, PROTIME      Radiology Results:   No results found

## 2018-12-11 ENCOUNTER — OFFICE VISIT (OUTPATIENT)
Dept: FAMILY MEDICINE CLINIC | Facility: CLINIC | Age: 38
End: 2018-12-11
Payer: COMMERCIAL

## 2018-12-11 VITALS
DIASTOLIC BLOOD PRESSURE: 62 MMHG | RESPIRATION RATE: 18 BRPM | WEIGHT: 153 LBS | OXYGEN SATURATION: 99 % | HEART RATE: 90 BPM | HEIGHT: 64 IN | SYSTOLIC BLOOD PRESSURE: 110 MMHG | TEMPERATURE: 98.9 F | BODY MASS INDEX: 26.12 KG/M2

## 2018-12-11 DIAGNOSIS — K22.10 ULCERATIVE ESOPHAGITIS: ICD-10-CM

## 2018-12-11 DIAGNOSIS — R10.13 EPIGASTRIC PAIN: Primary | ICD-10-CM

## 2018-12-11 DIAGNOSIS — M54.81 OCCIPITAL NEURALGIA OF LEFT SIDE: ICD-10-CM

## 2018-12-11 DIAGNOSIS — R23.2 HOT FLASHES: ICD-10-CM

## 2018-12-11 DIAGNOSIS — K31.84 GASTROPARESIS: ICD-10-CM

## 2018-12-11 DIAGNOSIS — M54.12 LEFT CERVICAL RADICULOPATHY: ICD-10-CM

## 2018-12-11 DIAGNOSIS — J45.20 MILD INTERMITTENT ASTHMA WITHOUT COMPLICATION: ICD-10-CM

## 2018-12-11 DIAGNOSIS — K29.50 OTHER CHRONIC GASTRITIS WITHOUT HEMORRHAGE: ICD-10-CM

## 2018-12-11 DIAGNOSIS — L65.9 HAIR LOSS: ICD-10-CM

## 2018-12-11 PROCEDURE — 1036F TOBACCO NON-USER: CPT | Performed by: INTERNAL MEDICINE

## 2018-12-11 PROCEDURE — 3008F BODY MASS INDEX DOCD: CPT | Performed by: INTERNAL MEDICINE

## 2018-12-11 PROCEDURE — 99204 OFFICE O/P NEW MOD 45 MIN: CPT | Performed by: INTERNAL MEDICINE

## 2018-12-11 NOTE — PROGRESS NOTES
Assessment/Plan:      Diet reviewed  Lifestyle modifications reviewed  Medications reviewed and ordered  Laboratory tests and studies reviewed and ordered  All patient's questions answered to patient satisfaction  Patient will continue for treat under treatment for gastroparesis as outlined by her gastroenterologist on the medications that we have listed as well as the dietary measures that she has been educated  Of course Reglan can cause a tremor and the patient is aware of this and aware of the fact that she should not be taking if possible  Her gastroenterologist L and other measures and medications available in [facility]) that she could have access to  She is also taking Protonix  Studies to evaluate her loss of have been ordered on her blood tests  Patient will use soft cervical collar and was cautioned measures to decrease her occipital neuralgia and her radicular cervical C6 discomfort  Should worsen she was informed that she could get an MRI if more invasive treatments were necessary  Physical therapy would be a consideration also  Subjective:      Patient ID: Himanshu Franks is a 41 y o  female  HPI  This 56-year-old female is seen as a new patient with the following complaints:    Patient has a history of documented gastroparesis diagnosed with 2 endoscopies and nuclear test   As result of the gastroparesis she had ulcerative esophagitis and gastritis both of which have been treated with medications as noted below  He has been warned to stay off Reglan due to the risk of a developing a permanent tremor by her specialist in the past but still has been taking  Patient complains of occipital neuralgia in the left posterior neck which radiates around under her ear and into her mandible and along with this pain in the left posterior lateral triceps area radiating into the hand and the 2nd and 3rd fingers she notices that this is related to large part to head position      Patient has had some hair loss from her scalp increased amounts but has no particular spots of hair loss and does not notice any hair loss on the rest of her body  She has also had some hot flashes  Current Outpatient Prescriptions:     albuterol (PROAIR HFA) 90 mcg/act inhaler, Inhale 2 puffs every 6 (six) hours as needed for wheezing, Disp: 18 g, Rfl: 0    ketoconazole (NIZORAL) 2 % cream, Apply topically daily, Disp: 15 g, Rfl: 3    Metoclopramide HCl 5 MG TBDP, Every 6 hours, Disp: , Rfl:     pantoprazole (PROTONIX) 40 mg tablet, Take 1 tablet (40 mg total) by mouth daily, Disp: 90 tablet, Rfl: 0    metoclopramide (REGLAN) 5 mg tablet, Take 1 tablet (5 mg total) by mouth 4 (four) times a day (Patient not taking: Reported on 12/11/2018 ), Disp: 120 tablet, Rfl: 5    The following portions of the patient's history were reviewed and updated as appropriate: allergies, current medications, past family history, past medical history, past social history, past surgical history and problem list     Review of Systems   Constitutional: Negative for appetite change, fatigue, fever and unexpected weight change  HENT: Negative for rhinorrhea, sinus pain, sinus pressure, sneezing and sore throat  Eyes: Negative for visual disturbance  Respiratory: Negative for cough, chest tightness, shortness of breath and wheezing  Cardiovascular: Negative for chest pain, palpitations and leg swelling  Gastrointestinal: Negative for abdominal distention, abdominal pain, blood in stool, constipation, diarrhea, nausea and vomiting  Endocrine: Negative for polydipsia and polyuria  Genitourinary: Negative for decreased urine volume, difficulty urinating, dysuria, hematuria and urgency  Musculoskeletal: Negative for arthralgias, back pain, joint swelling and neck pain  Skin: Negative for rash  Allergic/Immunologic: Negative for environmental allergies  Neurological: Positive for numbness   Negative for tremors, weakness, light-headedness and headaches  Hematological: Does not bruise/bleed easily  Psychiatric/Behavioral: Negative for agitation, behavioral problems, confusion and dysphoric mood  The patient is nervous/anxious  Family History   Problem Relation Age of Onset    Heart disease Mother     Heart disease Father     Diabetes Paternal Grandmother     Diabetes Maternal Uncle        Past Medical History:   Diagnosis Date    Asthma     Colon polyp     Gastroparesis        Past Surgical History:   Procedure Laterality Date    COLONOSCOPY      TUBAL LIGATION  05/13/2012    UPPER GASTROINTESTINAL ENDOSCOPY         Social History     Social History    Marital status: /Civil Union     Spouse name: N/A    Number of children: 3    Years of education: N/A     Occupational History    Homemaker Other     Self     Social History Main Topics    Smoking status: Never Smoker    Smokeless tobacco: Never Used    Alcohol use No    Drug use: No    Sexual activity: Yes     Partners: Male     Birth control/ protection: Female Sterilization     Other Topics Concern    None     Social History Narrative         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        No Known Allergies      Objective:      /62 (Patient Position: Supine)   Pulse 90   Temp 98 9 °F (37 2 °C)   Resp 18   Ht 5' 4" (1 626 m)   Wt 69 4 kg (153 lb)   SpO2 99%   BMI 26 26 kg/m²        Physical Exam   Constitutional: She is oriented to person, place, and time  She appears well-developed and well-nourished  No distress  HENT:   Head: Normocephalic and atraumatic  Nose: Nose normal    Mouth/Throat: Oropharynx is clear and moist  No oropharyngeal exudate  Eyes: Pupils are equal, round, and reactive to light  Conjunctivae and EOM are normal  No scleral icterus  Neck: Normal range of motion  Neck supple  No JVD present  No tracheal deviation present  No thyromegaly present     Cardiovascular: Normal rate, regular rhythm and normal heart sounds  Exam reveals no gallop and no friction rub  No murmur heard  Pulmonary/Chest: Effort normal  No respiratory distress  She has no wheezes  She has no rales  She exhibits no tenderness  Abdominal: Soft  Bowel sounds are normal  She exhibits no distension and no mass  There is no tenderness  There is no rebound and no guarding  Musculoskeletal: Normal range of motion  She exhibits no edema or deformity  Lymphadenopathy:     She has no cervical adenopathy  Neurological: She is alert and oriented to person, place, and time  No cranial nerve deficit  Coordination normal    Skin: Skin is warm and dry  No rash noted  Psychiatric: She has a normal mood and affect   Her behavior is normal  Judgment and thought content normal

## 2019-01-15 ENCOUNTER — TELEPHONE (OUTPATIENT)
Dept: GASTROENTEROLOGY | Facility: CLINIC | Age: 39
End: 2019-01-15

## 2019-01-15 NOTE — TELEPHONE ENCOUNTER
david patient        She wants to cancel her egd on 1-17-19 until she checks with her insurance company---will call back to rs

## 2019-03-25 ENCOUNTER — APPOINTMENT (OUTPATIENT)
Dept: PREADMISSION TESTING | Facility: HOSPITAL | Age: 39
End: 2019-03-25
Attending: SURGERY
Payer: COMMERCIAL

## 2019-03-25 ENCOUNTER — APPOINTMENT (OUTPATIENT)
Dept: LAB | Facility: HOSPITAL | Age: 39
End: 2019-03-25
Attending: SURGERY
Payer: COMMERCIAL

## 2019-03-25 ENCOUNTER — TRANSCRIBE ORDERS (OUTPATIENT)
Dept: LAB | Facility: HOSPITAL | Age: 39
End: 2019-03-25

## 2019-03-25 VITALS
RESPIRATION RATE: 18 BRPM | HEART RATE: 84 BPM | BODY MASS INDEX: 26.46 KG/M2 | OXYGEN SATURATION: 99 % | HEIGHT: 64 IN | SYSTOLIC BLOOD PRESSURE: 118 MMHG | DIASTOLIC BLOOD PRESSURE: 76 MMHG | TEMPERATURE: 97.7 F | WEIGHT: 155 LBS

## 2019-03-25 DIAGNOSIS — Z01.818 PREOP EXAMINATION: Primary | ICD-10-CM

## 2019-03-25 DIAGNOSIS — N62 HYPERTROPHY OF BREAST: ICD-10-CM

## 2019-03-25 DIAGNOSIS — N62 HYPERTROPHY OF BREAST: Primary | ICD-10-CM

## 2019-03-25 PROBLEM — K14.8 TONGUE MASS: Status: ACTIVE | Noted: 2018-04-16

## 2019-03-25 PROBLEM — L30.4 INTERTRIGO: Status: ACTIVE | Noted: 2018-08-01

## 2019-03-25 PROBLEM — M25.472 EDEMA OF BOTH ANKLES: Status: ACTIVE | Noted: 2018-04-16

## 2019-03-25 PROBLEM — H69.92 DYSFUNCTION OF LEFT EUSTACHIAN TUBE: Status: ACTIVE | Noted: 2018-08-01

## 2019-03-25 PROBLEM — K31.84 GASTROPARESIS: Status: ACTIVE | Noted: 2018-04-16

## 2019-03-25 PROBLEM — J45.20 MILD INTERMITTENT ASTHMA WITHOUT COMPLICATION: Status: ACTIVE | Noted: 2018-04-16

## 2019-03-25 PROBLEM — R10.13 EPIGASTRIC PAIN: Status: ACTIVE | Noted: 2018-08-01

## 2019-03-25 PROBLEM — R11.2 NON-INTRACTABLE VOMITING WITH NAUSEA: Status: ACTIVE | Noted: 2018-11-28

## 2019-03-25 PROBLEM — M25.471 EDEMA OF BOTH ANKLES: Status: ACTIVE | Noted: 2018-04-16

## 2019-03-25 LAB
ANION GAP SERPL CALC-SCNC: 10 MEQ/L (ref 3–15)
BUN SERPL-MCNC: 6 MG/DL (ref 8–20)
CALCIUM SERPL-MCNC: 9.7 MG/DL (ref 8.9–10.3)
CHLORIDE SERPL-SCNC: 107 MEQ/L (ref 98–109)
CO2 SERPL-SCNC: 23 MEQ/L (ref 22–32)
CREAT SERPL-MCNC: 0.7 MG/DL
ERYTHROCYTE [DISTWIDTH] IN BLOOD BY AUTOMATED COUNT: 12.1 % (ref 11.7–14.4)
GFR SERPL CREATININE-BSD FRML MDRD: >60 ML/MIN/1.73M*2
GLUCOSE SERPL-MCNC: 84 MG/DL (ref 70–99)
HCG UR QL: NEGATIVE
HCT VFR BLDCO AUTO: 39.1 %
HGB BLD-MCNC: 13.3 G/DL
MCH RBC QN AUTO: 29.6 PG (ref 28–33.2)
MCHC RBC AUTO-ENTMCNC: 34 G/DL (ref 32.2–35.5)
MCV RBC AUTO: 86.9 FL (ref 83–98)
PDW BLD AUTO: 11.2 FL (ref 9.4–12.3)
PLATELET # BLD AUTO: 228 K/UL
POTASSIUM SERPL-SCNC: 4 MEQ/L (ref 3.6–5.1)
RBC # BLD AUTO: 4.5 M/UL (ref 3.93–5.22)
SODIUM SERPL-SCNC: 140 MEQ/L (ref 136–144)
WBC # BLD AUTO: 7.1 K/UL

## 2019-03-25 PROCEDURE — 82310 ASSAY OF CALCIUM: CPT

## 2019-03-25 PROCEDURE — 36415 COLL VENOUS BLD VENIPUNCTURE: CPT

## 2019-03-25 PROCEDURE — 85027 COMPLETE CBC AUTOMATED: CPT

## 2019-03-25 PROCEDURE — 84703 CHORIONIC GONADOTROPIN ASSAY: CPT

## 2019-03-25 RX ORDER — ALBUTEROL SULFATE 90 UG/1
2 INHALANT RESPIRATORY (INHALATION) AS NEEDED
COMMUNITY
Start: 2018-06-11

## 2019-03-25 ASSESSMENT — PAIN SCALES - GENERAL: PAINLEVEL: 0-NO PAIN

## 2019-03-25 NOTE — H&P
History and Physical  Pre-admission testing         HISTORY OF PRESENT ILLNESS      Wendy Figueroa is an 38 y.o. female with a past medical history of Asthma, gastroparesis, edema of both ankles. She presents to [facility] for preoperative evaluation. She is scheduled for Bilateral Breast Reduction on 4/1/2019.    PAST MEDICAL AND SURGICAL HISTORY      Past Medical History:   Diagnosis Date   • Asthma    • Edema of both ankles    • Gastroparesis    • Kidney stones    • Migraines        Past Surgical History:   Procedure Laterality Date   • COLONOSCOPY     • TUBAL LIGATION     • UPPER GASTROINTESTINAL ENDOSCOPY         PROBLEM LIST     Patient Active Problem List   Diagnosis   • Dysfunction of left eustachian tube   • Edema of both ankles   • Epigastric pain   • Gastroparesis   • Intertrigo   • Mild intermittent asthma without complication   • Non-intractable vomiting with nausea   • Tongue mass       MEDICATIONS        Current Outpatient Prescriptions:   •  albuterol HFA (PROAIR HFA) 90 mcg/actuation inhaler, Inhale 2 puffs as needed., Disp: , Rfl:   •  BIOTIN ORAL, Take 1 tablet by mouth 2 (two) times a day., Disp: , Rfl:   •  folic acid/multivit-min/lutein (CENTRUM SILVER ORAL), Take 1 tablet by mouth once daily., Disp: , Rfl:     ALLERGIES      No Known Allergies    FAMILY HISTORY      Family History   Problem Relation Age of Onset   • Heart disease Mother    • Heart disease Father        SOCIAL HISTORY      Social History     Social History   • Marital status:      Spouse name: N/A   • Number of children: 3   • Years of education: N/A     Occupational History   • Sales      Social History Main Topics   • Smoking status: Never Smoker   • Smokeless tobacco: Never Used   • Alcohol use Yes      Comment: Rarely   • Drug use: No   • Sexual activity: Yes     Partners: Male     Birth control/ protection: None     Other Topics Concern   • Not on file     Social History Narrative   • No narrative on file       REVIEW  "OF SYSTEMS      Review of Systems   All other systems reviewed and are negative.      PHYSICAL EXAMINATION      /76 (BP Location: Right upper arm, Patient Position: Sitting)   Pulse 84   Temp 36.5 °C (97.7 °F) (Oral)   Resp 18   Ht 1.626 m (5' 4\")   Wt 70.3 kg (155 lb)   LMP 03/03/2019   SpO2 99% Comment: Rm Air  BMI 26.61 kg/m²   Body mass index is 26.61 kg/m².    Physical Exam   Constitutional: She is oriented to person, place, and time. She appears well-developed and well-nourished.   HENT:   Head: Normocephalic and atraumatic.   Eyes: Conjunctivae and EOM are normal. Pupils are equal, round, and reactive to light.   Neck: Normal range of motion. Neck supple. No thyromegaly present.   Cardiovascular: Normal rate, regular rhythm, normal heart sounds and intact distal pulses.    Pulmonary/Chest: Effort normal and breath sounds normal. No respiratory distress. She has no wheezes.   Abdominal: Soft. Bowel sounds are normal.   Genitourinary:   Genitourinary Comments: Examination is deferred   Musculoskeletal: Normal range of motion. She exhibits edema.   + Edema Bilateral ankles   Neurological: She is alert and oriented to person, place, and time.   Skin: Skin is warm and dry.   Psychiatric: She has a normal mood and affect. Her behavior is normal.   Vitals reviewed.         ORLIN Douglas  3/25/2019     "

## 2019-03-25 NOTE — PRE-PROCEDURE INSTRUCTIONS
1. We will call you between 3 pm and 7 pm on March 29, 2019 to determine that arrival time for your procedure. If you do not hear by 6PM. Please call [phone] for arrival time.    2. Please [facility] to [facility] [place] [facility], [facility] and report to the admission desk on first floor on the day of your procedure.   3. Please follow the following fasting guidelines:   Nothing to eat or drink after midnight unless otherwise instructed by  your physician.    4. Early on the morning of the procedure please USE  Your inhaler    Bring your inhaler     5. Other Instructions:    6. If you develop a cold, cough, fever, rash, or other symptom prior to the data of the procedure, please report it to your physician immediately.   7. If you need to cancel the procedure for any reason, please contact your physician or call the unit listed above.   8. Make arrangements to have someone drive you home from the procedure. If you have not arranged for transportation home, your surgery may be cancelled.    9. You may not take public transportation unless accompanied by a responsible person.   10. You may not drive a car or operate complex or potentially dangerous machinery for 24 hours following anesthesia and/or sedation.   11. If it is medically necessary for you to have a longer stay, you will be informed as soon as the decision is made.   12. Do not wear or bring anything of value to the hospital including jewelry of any kind. Do not wear make-up or contact lenses. DO bring your glasses and hearing aid.   13. No lotion, creams, powders, or oils on skin the morning of procedure    14. Dress in comfortable clothes.   15.  If instructed, please bring a copy of your Advanced Directive (Living Will/Durable Power of ) on the day of your procedure.      Pre operative instructions given as per protocol.  Form explained by: ORLIN Douglas     I have read and understand the above information. I have had sufficient  opportunity to ask questions I might have and they have been answered to my satisfaction. I agree to comply with the Patient Responsibilities listed above and have received a copy of this form.

## 2019-03-31 ENCOUNTER — ANESTHESIA EVENT (OUTPATIENT)
Dept: SURGERY | Facility: HOSPITAL | Age: 39
Setting detail: HOSPITAL OUTPATIENT SURGERY
End: 2019-03-31
Payer: COMMERCIAL

## 2019-03-31 ASSESSMENT — ENCOUNTER SYMPTOMS: HEADACHES: 1

## 2019-04-01 ENCOUNTER — HOSPITAL ENCOUNTER (OUTPATIENT)
Facility: HOSPITAL | Age: 39
Setting detail: HOSPITAL OUTPATIENT SURGERY
Discharge: HOME | End: 2019-04-01
Attending: SURGERY | Admitting: SURGERY
Payer: COMMERCIAL

## 2019-04-01 ENCOUNTER — ANESTHESIA (OUTPATIENT)
Dept: SURGERY | Facility: HOSPITAL | Age: 39
Setting detail: HOSPITAL OUTPATIENT SURGERY
End: 2019-04-01
Payer: COMMERCIAL

## 2019-04-01 VITALS
OXYGEN SATURATION: 97 % | DIASTOLIC BLOOD PRESSURE: 59 MMHG | RESPIRATION RATE: 18 BRPM | TEMPERATURE: 98.8 F | SYSTOLIC BLOOD PRESSURE: 88 MMHG | HEART RATE: 75 BPM

## 2019-04-01 DIAGNOSIS — N62 MACROMASTIA: ICD-10-CM

## 2019-04-01 LAB
B-HCG UR QL: NEGATIVE
POCT TEST: NORMAL

## 2019-04-01 PROCEDURE — 71000002 HC PACU PHASE 2 INITIAL 30MIN: Performed by: SURGERY

## 2019-04-01 PROCEDURE — 63600000 HC DRUGS/DETAIL CODE: Performed by: ANESTHESIOLOGY

## 2019-04-01 PROCEDURE — 27800000 HC SUPPLY/IMPLANTS: Performed by: SURGERY

## 2019-04-01 PROCEDURE — 71000011 HC PACU PHASE 1 EA ADDL MIN: Performed by: SURGERY

## 2019-04-01 PROCEDURE — 63600000 HC DRUGS/DETAIL CODE: Mod: JW | Performed by: NURSE ANESTHETIST, CERTIFIED REGISTERED

## 2019-04-01 PROCEDURE — 88305 TISSUE EXAM BY PATHOLOGIST: CPT | Performed by: SURGERY

## 2019-04-01 PROCEDURE — 0HBV0ZZ EXCISION OF BILATERAL BREAST, OPEN APPROACH: ICD-10-PCS | Performed by: SURGERY

## 2019-04-01 PROCEDURE — 63600000 HC DRUGS/DETAIL CODE: Performed by: SURGERY

## 2019-04-01 PROCEDURE — 37000001 HC ANESTHESIA GENERAL: Performed by: SURGERY

## 2019-04-01 PROCEDURE — 25000000 HC PHARMACY GENERAL: Performed by: SURGERY

## 2019-04-01 PROCEDURE — 25000000 HC PHARMACY GENERAL: Performed by: NURSE ANESTHETIST, CERTIFIED REGISTERED

## 2019-04-01 PROCEDURE — 36000003 HC OR LEVEL 3 INITIAL 30MIN: Performed by: SURGERY

## 2019-04-01 PROCEDURE — 63700000 HC SELF-ADMINISTRABLE DRUG: Performed by: SURGERY

## 2019-04-01 PROCEDURE — 27200000 HC STERILE SUPPLY: Performed by: SURGERY

## 2019-04-01 PROCEDURE — 25800000 HC PHARMACY IV SOLUTIONS: Performed by: SURGERY

## 2019-04-01 PROCEDURE — 71000012 HC PACU PHASE 2 EA ADDL MIN: Performed by: SURGERY

## 2019-04-01 PROCEDURE — 63600000 HC DRUGS/DETAIL CODE: Performed by: NURSE ANESTHETIST, CERTIFIED REGISTERED

## 2019-04-01 PROCEDURE — 36000013 HC OR LEVEL 3 EA ADDL MIN: Performed by: SURGERY

## 2019-04-01 PROCEDURE — 71000001 HC PACU PHASE 1 INITIAL 30MIN: Performed by: SURGERY

## 2019-04-01 RX ORDER — ROCURONIUM BROMIDE 10 MG/ML
INJECTION, SOLUTION INTRAVENOUS AS NEEDED
Status: DISCONTINUED | OUTPATIENT
Start: 2019-04-01 | End: 2019-04-01 | Stop reason: SURG

## 2019-04-01 RX ORDER — LIDOCAINE HYDROCHLORIDE 10 MG/ML
INJECTION, SOLUTION INFILTRATION; PERINEURAL AS NEEDED
Status: DISCONTINUED | OUTPATIENT
Start: 2019-04-01 | End: 2019-04-01 | Stop reason: SURG

## 2019-04-01 RX ORDER — NEOSTIGMINE METHYLSULFATE 1 MG/ML
INJECTION INTRAVENOUS AS NEEDED
Status: DISCONTINUED | OUTPATIENT
Start: 2019-04-01 | End: 2019-04-01 | Stop reason: SURG

## 2019-04-01 RX ORDER — HYDROMORPHONE HYDROCHLORIDE 2 MG/ML
0.5 INJECTION, SOLUTION INTRAMUSCULAR; INTRAVENOUS; SUBCUTANEOUS
Status: DISCONTINUED | OUTPATIENT
Start: 2019-04-01 | End: 2019-04-01 | Stop reason: HOSPADM

## 2019-04-01 RX ORDER — GLYCOPYRROLATE 0.6MG/3ML
SYRINGE (ML) INTRAVENOUS AS NEEDED
Status: DISCONTINUED | OUTPATIENT
Start: 2019-04-01 | End: 2019-04-01 | Stop reason: SURG

## 2019-04-01 RX ORDER — DEXTROSE 50 % IN WATER (D50W) INTRAVENOUS SYRINGE
25 AS NEEDED
Status: DISCONTINUED | OUTPATIENT
Start: 2019-04-01 | End: 2019-04-01 | Stop reason: HOSPADM

## 2019-04-01 RX ORDER — OXYCODONE HYDROCHLORIDE 5 MG/1
5 TABLET ORAL ONCE AS NEEDED
Status: DISCONTINUED | OUTPATIENT
Start: 2019-04-01 | End: 2019-04-01 | Stop reason: HOSPADM

## 2019-04-01 RX ORDER — ONDANSETRON HYDROCHLORIDE 2 MG/ML
4 INJECTION, SOLUTION INTRAVENOUS
Status: DISCONTINUED | OUTPATIENT
Start: 2019-04-01 | End: 2019-04-01 | Stop reason: HOSPADM

## 2019-04-01 RX ORDER — MIDAZOLAM HYDROCHLORIDE 2 MG/2ML
INJECTION, SOLUTION INTRAMUSCULAR; INTRAVENOUS AS NEEDED
Status: DISCONTINUED | OUTPATIENT
Start: 2019-04-01 | End: 2019-04-01 | Stop reason: SURG

## 2019-04-01 RX ORDER — ONDANSETRON HYDROCHLORIDE 2 MG/ML
INJECTION, SOLUTION INTRAVENOUS AS NEEDED
Status: DISCONTINUED | OUTPATIENT
Start: 2019-04-01 | End: 2019-04-01 | Stop reason: SURG

## 2019-04-01 RX ORDER — DIPHENHYDRAMINE HCL 50 MG/ML
12.5 VIAL (ML) INJECTION ONCE
Status: COMPLETED | OUTPATIENT
Start: 2019-04-01 | End: 2019-04-01

## 2019-04-01 RX ORDER — DEXTROSE 40 %
15-30 GEL (GRAM) ORAL AS NEEDED
Status: DISCONTINUED | OUTPATIENT
Start: 2019-04-01 | End: 2019-04-01 | Stop reason: HOSPADM

## 2019-04-01 RX ORDER — OXYCODONE HYDROCHLORIDE 5 MG/1
5 TABLET ORAL EVERY 4 HOURS PRN
Status: DISCONTINUED | OUTPATIENT
Start: 2019-04-01 | End: 2019-04-01 | Stop reason: HOSPADM

## 2019-04-01 RX ORDER — SCOPOLAMINE 1 MG/3D
1 PATCH, EXTENDED RELEASE TRANSDERMAL
Status: DISCONTINUED | OUTPATIENT
Start: 2019-04-01 | End: 2019-04-01 | Stop reason: HOSPADM

## 2019-04-01 RX ORDER — PROPOFOL 10 MG/ML
INJECTION, EMULSION INTRAVENOUS AS NEEDED
Status: DISCONTINUED | OUTPATIENT
Start: 2019-04-01 | End: 2019-04-01 | Stop reason: SURG

## 2019-04-01 RX ORDER — SODIUM CHLORIDE 9 MG/ML
INJECTION, SOLUTION INTRAVENOUS CONTINUOUS
Status: DISCONTINUED | OUTPATIENT
Start: 2019-04-01 | End: 2019-04-01 | Stop reason: HOSPADM

## 2019-04-01 RX ORDER — ACETAMINOPHEN 325 MG/1
650 TABLET ORAL EVERY 4 HOURS PRN
Status: DISCONTINUED | OUTPATIENT
Start: 2019-04-01 | End: 2019-04-01 | Stop reason: HOSPADM

## 2019-04-01 RX ORDER — FENTANYL CITRATE 50 UG/ML
INJECTION, SOLUTION INTRAMUSCULAR; INTRAVENOUS AS NEEDED
Status: DISCONTINUED | OUTPATIENT
Start: 2019-04-01 | End: 2019-04-01 | Stop reason: SURG

## 2019-04-01 RX ORDER — FENTANYL CITRATE 50 UG/ML
25 INJECTION, SOLUTION INTRAMUSCULAR; INTRAVENOUS
Status: DISCONTINUED | OUTPATIENT
Start: 2019-04-01 | End: 2019-04-01 | Stop reason: HOSPADM

## 2019-04-01 RX ORDER — IBUPROFEN 200 MG
16-32 TABLET ORAL AS NEEDED
Status: DISCONTINUED | OUTPATIENT
Start: 2019-04-01 | End: 2019-04-01 | Stop reason: HOSPADM

## 2019-04-01 RX ADMIN — FENTANYL CITRATE 50 MCG: 50 INJECTION, SOLUTION INTRAMUSCULAR; INTRAVENOUS at 08:35

## 2019-04-01 RX ADMIN — FENTANYL CITRATE 50 MCG: 50 INJECTION, SOLUTION INTRAMUSCULAR; INTRAVENOUS at 08:47

## 2019-04-01 RX ADMIN — ONDANSETRON 4 MG: 2 INJECTION INTRAMUSCULAR; INTRAVENOUS at 08:07

## 2019-04-01 RX ADMIN — FENTANYL CITRATE 25 MCG: 50 INJECTION INTRAMUSCULAR; INTRAVENOUS at 11:14

## 2019-04-01 RX ADMIN — Medication 0.6 MG: at 09:43

## 2019-04-01 RX ADMIN — SCOPALAMINE 1 PATCH: 1 PATCH, EXTENDED RELEASE TRANSDERMAL at 07:09

## 2019-04-01 RX ADMIN — Medication 3 MG: at 09:43

## 2019-04-01 RX ADMIN — DIPHENHYDRAMINE HYDROCHLORIDE 12.5 MG: 50 INJECTION, SOLUTION INTRAMUSCULAR; INTRAVENOUS at 11:43

## 2019-04-01 RX ADMIN — LIDOCAINE HYDROCHLORIDE 5 ML: 10 INJECTION, SOLUTION INFILTRATION; PERINEURAL at 08:07

## 2019-04-01 RX ADMIN — Medication 2 G: at 08:17

## 2019-04-01 RX ADMIN — FENTANYL CITRATE 50 MCG: 50 INJECTION, SOLUTION INTRAMUSCULAR; INTRAVENOUS at 08:07

## 2019-04-01 RX ADMIN — FENTANYL CITRATE 25 MCG: 50 INJECTION INTRAMUSCULAR; INTRAVENOUS at 10:52

## 2019-04-01 RX ADMIN — MIDAZOLAM HYDROCHLORIDE 2 MG: 1 INJECTION, SOLUTION INTRAMUSCULAR; INTRAVENOUS at 08:01

## 2019-04-01 RX ADMIN — ONDANSETRON 4 MG: 2 INJECTION INTRAMUSCULAR; INTRAVENOUS at 10:44

## 2019-04-01 RX ADMIN — SODIUM CHLORIDE: 900 INJECTION, SOLUTION INTRAVENOUS at 07:59

## 2019-04-01 RX ADMIN — ROCURONIUM BROMIDE 50 MG: 10 INJECTION, SOLUTION INTRAVENOUS at 08:07

## 2019-04-01 RX ADMIN — FENTANYL CITRATE 25 MCG: 50 INJECTION INTRAMUSCULAR; INTRAVENOUS at 10:09

## 2019-04-01 RX ADMIN — PROPOFOL 200 MG: 10 INJECTION, EMULSION INTRAVENOUS at 08:07

## 2019-04-01 ASSESSMENT — PAIN - FUNCTIONAL ASSESSMENT: PAIN_FUNCTIONAL_ASSESSMENT: 0-10

## 2019-04-01 ASSESSMENT — PAIN DESCRIPTION - DESCRIPTORS: DESCRIPTORS: DISCOMFORT

## 2019-04-01 NOTE — PERIOPERATIVE NURSING NOTE
"Pt. Voided prior to d/c and received instructions for home. Vss. Pt refuses pain meds at this time as \"pain is tolerable.\"  "

## 2019-04-01 NOTE — OR SURGEON
Pre-Procedure patient identification:  I am the primary operating surgeon/proceduralist and I have identified the patient on 04/01/19 at 10:04 AM Subhash Fernandez MD  Phone Number: [phone]

## 2019-04-01 NOTE — ANESTHESIA PROCEDURE NOTES
Airway  Urgency: elective    Start Time: 4/1/2019 8:11 AM    General Information and Staff    Patient location during procedure: OR  Anesthesiologist: RILEY SO  Resident/CRNA: KAREN MEAD  Performed: resident/CRNA     Indications and Patient Condition  Indications for airway management: anesthesia  Sedation level: deep  Preoxygenated: yes  Patient position: sniffing  Mask difficulty assessment: 1 - vent by mask    Final Airway Details  Final airway type: endotracheal airway      Successful airway: ETT    Successful intubation technique: direct laryngoscopy  Facilitating devices/methods: intubating stylet  Endotracheal tube insertion site: oral  Blade: Kandi  Blade size: #3  ETT size: 7.0 mm  Cormack-Lehane Classification: grade I - full view of glottis  Placement verified by: chest auscultation and capnometry   Measured from: lips  ETT to lips (cm): 21  Number of attempts at approach: 1

## 2019-04-01 NOTE — ANESTHESIA PREPROCEDURE EVALUATION
Anesthesia ROS/MED HX    Anesthesia History - neg  Pulmonary    asthma  Neuro/Psych    Headaches  Cardiovascular- neg  Hematological - neg  GI/Hepatic- neg  Musculoskeletal- neg  Renal Disease- neg  Endo/Other- neg  Body Habitus: Obese      Past Surgical History:   Procedure Laterality Date   • COLONOSCOPY     • TUBAL LIGATION     • UPPER GASTROINTESTINAL ENDOSCOPY       Patient Active Problem List   Diagnosis   • Dysfunction of left eustachian tube   • Edema of both ankles   • Epigastric pain   • Gastroparesis   • Intertrigo   • Mild intermittent asthma without complication   • Non-intractable vomiting with nausea   • Tongue mass        Past Medical History:   Diagnosis Date   • Asthma    • Edema of both ankles    • Gastroparesis    • Kidney stones    • Migraines        Past Surgical History:   Procedure Laterality Date   • COLONOSCOPY     • TUBAL LIGATION     • UPPER GASTROINTESTINAL ENDOSCOPY         Social History     Social History   • Marital status:      Spouse name: N/A   • Number of children: 3   • Years of education: N/A     Occupational History   • Sales      Social History Main Topics   • Smoking status: Never Smoker   • Smokeless tobacco: Never Used   • Alcohol use Yes      Comment: Rarely   • Drug use: No   • Sexual activity: Yes     Partners: Male     Birth control/ protection: None     Other Topics Concern   • Not on file     Social History Narrative   • No narrative on file       No Known Allergies    No current facility-administered medications for this encounter.        Prior to Admission medications    Medication Sig Start Date End Date Taking? Authorizing Provider   albuterol HFA (PROAIR HFA) 90 mcg/actuation inhaler Inhale 2 puffs as needed. 6/11/18   Gilbert Steven MD   BIOTIN ORAL Take 1 tablet by mouth 2 (two) times a day.    Gilbert Steven MD   folic acid/multivit-min/lutein (CENTRUM SILVER ORAL) Take 1 tablet by mouth once daily.    Gilbert Steven MD       There  were no vitals filed for this visit.    CBC Results       03/25/19                          1542           WBC 7.10           RBC 4.50           HGB 13.3           HCT 39.1           MCV 86.9           MCH 29.6           MCHC 34.0                                      BMP Results       03/25/19                          1542                      K 4.0           Cl 107           CO2 23           Glucose 84           BUN 6 (L)           Creatinine 0.7           Calcium 9.7           Anion Gap 10           EGFR &gt;60.0                                 The patient does not have an active anticoagulation episode.    No results found for: HCGPREGUR, PREGSERUM, HCG, HCGQUANT    No orders to display         Physical Exam    Airway   Mallampati: II   TM distance: >3 FB   Neck ROM: full  Cardiovascular    Rhythm: regular   Rate: normal        Anesthesia Plan    Plan: general    Technique: general endotracheal     Lines and Monitors: PIV     Airway: oral intubation   ASA 3

## 2019-04-01 NOTE — H&P (VIEW-ONLY)
History and Physical  Pre-admission testing         HISTORY OF PRESENT ILLNESS      Wendy Figueroa is an 38 y.o. female with a past medical history of Asthma, gastroparesis, edema of both ankles. She presents to [facility] for preoperative evaluation. She is scheduled for Bilateral Breast Reduction on 4/1/2019.    PAST MEDICAL AND SURGICAL HISTORY      Past Medical History:   Diagnosis Date   • Asthma    • Edema of both ankles    • Gastroparesis    • Kidney stones    • Migraines        Past Surgical History:   Procedure Laterality Date   • COLONOSCOPY     • TUBAL LIGATION     • UPPER GASTROINTESTINAL ENDOSCOPY         PROBLEM LIST     Patient Active Problem List   Diagnosis   • Dysfunction of left eustachian tube   • Edema of both ankles   • Epigastric pain   • Gastroparesis   • Intertrigo   • Mild intermittent asthma without complication   • Non-intractable vomiting with nausea   • Tongue mass       MEDICATIONS        Current Outpatient Prescriptions:   •  albuterol HFA (PROAIR HFA) 90 mcg/actuation inhaler, Inhale 2 puffs as needed., Disp: , Rfl:   •  BIOTIN ORAL, Take 1 tablet by mouth 2 (two) times a day., Disp: , Rfl:   •  folic acid/multivit-min/lutein (CENTRUM SILVER ORAL), Take 1 tablet by mouth once daily., Disp: , Rfl:     ALLERGIES      No Known Allergies    FAMILY HISTORY      Family History   Problem Relation Age of Onset   • Heart disease Mother    • Heart disease Father        SOCIAL HISTORY      Social History     Social History   • Marital status:      Spouse name: N/A   • Number of children: 3   • Years of education: N/A     Occupational History   • Sales      Social History Main Topics   • Smoking status: Never Smoker   • Smokeless tobacco: Never Used   • Alcohol use Yes      Comment: Rarely   • Drug use: No   • Sexual activity: Yes     Partners: Male     Birth control/ protection: None     Other Topics Concern   • Not on file     Social History Narrative   • No narrative on file       REVIEW  "OF SYSTEMS      Review of Systems   All other systems reviewed and are negative.      PHYSICAL EXAMINATION      /76 (BP Location: Right upper arm, Patient Position: Sitting)   Pulse 84   Temp 36.5 °C (97.7 °F) (Oral)   Resp 18   Ht 1.626 m (5' 4\")   Wt 70.3 kg (155 lb)   LMP 03/03/2019   SpO2 99% Comment: Rm Air  BMI 26.61 kg/m²   Body mass index is 26.61 kg/m².    Physical Exam   Constitutional: She is oriented to person, place, and time. She appears well-developed and well-nourished.   HENT:   Head: Normocephalic and atraumatic.   Eyes: Conjunctivae and EOM are normal. Pupils are equal, round, and reactive to light.   Neck: Normal range of motion. Neck supple. No thyromegaly present.   Cardiovascular: Normal rate, regular rhythm, normal heart sounds and intact distal pulses.    Pulmonary/Chest: Effort normal and breath sounds normal. No respiratory distress. She has no wheezes.   Abdominal: Soft. Bowel sounds are normal.   Genitourinary:   Genitourinary Comments: Examination is deferred   Musculoskeletal: Normal range of motion. She exhibits edema.   + Edema Bilateral ankles   Neurological: She is alert and oriented to person, place, and time.   Skin: Skin is warm and dry.   Psychiatric: She has a normal mood and affect. Her behavior is normal.   Vitals reviewed.         ORLIN Douglas  3/25/2019     "

## 2019-04-01 NOTE — DISCHARGE INSTRUCTIONS
BREAST REDUCTION/MASTOPEXY     MEDICATIONS:     Allergies:_______none________________________________________________   _Percocet____________: Take __1____ every __4___ hours if needed for pain. Substitute Tylenol for mild pain. Avoid aspirin and Ibuprofen products. Do not drive or drink while using this medication.     INSTRUCTIONS:     When you get home, call for an appointment to see Dr. Fernandez in ____1 week________________.   You may shower and wash the wounds with soap and water even with drains in place.    You may shower 1-2 days after surgery.   Keep head elevated while sleeping - avoid sleeping on your side.   Do not remove the steri-strip tape, but you may trim them if they start to lift off.   Keep the dressing dry and clean.   Wear a bra without any underwire at all times, even at night, for the first week. Do not wear a sports bra.   No driving or housework for the first week. Do not lift your arms above your head. No exercise, straining or lifting until approved by Dr. Fernandez.   You may return to work in about 1 week, unless otherwise specified by Dr. Fernandez.   Call if you experience unusual pain, redness, swelling, bruising, bleeding or sustained fever.

## 2019-04-01 NOTE — ANESTHESIA POSTPROCEDURE EVALUATION
Patient: Wendy Figueroa    Procedure Summary     Date:  04/01/19 Room / Location:  [facility]    Anesthesia Start:  0759 Anesthesia Stop:  1000    Procedure:  Bilateral breast reduction (Bilateral ) Diagnosis:       Macromastia      (n62)    Surgeon:  Subhash Fernandez MD Responsible Provider:  Josefa Delgado MD    Anesthesia Type:  general ASA Status:  3          Anesthesia Type: general  PACU Vitals  4/1/2019 0955 - 4/1/2019 1037      4/1/2019 0957 4/1/2019 1000 4/1/2019 1009 4/1/2019 1015    BP: - (!)  117/55 (!)  115/59 -    Temp: 36.9 °C (98.5 °F) - - -    Pulse: - 100 76 85    Resp: - 19 17 (!)  21    SpO2: - 100 % - 100 %              4/1/2019 1030             BP: 107/60       Temp: -       Pulse: 67       Resp: 12       SpO2: 100 %               Anesthesia Post Evaluation    Pain management: adequate  Patient location during evaluation: PACU  Patient participation: complete - patient participated  Level of consciousness: awake and alert  Cardiovascular status: acceptable  Airway Patency: adequate  Respiratory status: acceptable  Hydration status: acceptable  Anesthetic complications: no

## 2019-04-01 NOTE — OP NOTE
REPORT TYPE:  Operative Note    DATE OF OPERATION:  04/01/2019      PREOPERATIVE DIAGNOSIS:  Macromastia.    POSTOPERATIVE DIAGNOSIS:  Macromastia.    PROCEDURE PERFORMED:  Bilateral breast reduction.    SURGEON:  Subhash Fernandez MD    ASSISTANT:  Irineo Rhodes MD    ANESTHESIA:  General.    ESTIMATED BLOOD LOSS:  100 mL    COMPLICATIONS:  None.    INDICATIONS:  The patient is a 38-year-old female who had pain in her back and shoulders from her large breasts.  She was explained the risks and benefits of bilateral breast reduction, which are included but not limited to bleeding, infection, injury to   adjacent structure, need for further revisional surgery, scarring, asymmetry.  The patient understood these risks and agreed to go ahead with the procedure.  She was met in the preop holding area, identified and marked, taken back to the operating room.    IV antibiotics were given.  SCDs were placed and she was intubated by the anesthesia team.  She was prepped and draped in a sterile manner.  Timeout was undertaken.  After appropriately identifying the patient, attention was taken to the breast.  A   vertical breast reduction was planned with superior medial pedicles.  The pedicles were redrawn and a 40 cookie cutter was used to xi the new nipple areolar complexes.  A tourniquet was placed on each breast with a Penrose drain and deepithelialization   of the superior medial pedicle was done with a 10 blade.  Bovie cautery was used to stop any bleeding.  The remainder of the incisions were scored.  The tourniquets were taken off and the pedicle was cut straight down to the chest wall.  Once the   pedicle was freed from the surrounding breast tissue, the remainder of the incisions were cut with Bovie cautery, removing tissue from the inferior, lateral and superior portions of the breast.  Bovie cautery was used to stop any bleeding.  A 0 silk was   used to make a new nipple areolar complex and the pedicle was rotated  into correct position and secured temporarily with a tenaculum.  Closure of the medial and lateral breast pillars were done with 0 PDS followed by 2-0 Vicryl.  A 3-0 Vicryls and INSORB   staples were then used to do the deep dermal layers followed by a 4-0 Monocryl.  Benzoin and Steri-Strips were placed on the incision.  At the end of procedure, on the right side, 496 grams of tissue were removed and on the left side, 465 grams of   tissue were removed.  Patient was extubated, taken to PACU in stable condition.  Sponge and needle counts were correct x2.  I was present for the entire portion of procedure.      SANDY CEDEÑO MD        CC:     DD: 04/01/2019 09:39  DT: 04/01/2019 09:54  Voice ID: [id number]/Report ID: [id number]  ptseking

## 2019-04-01 NOTE — BRIEF OP NOTE
Bilateral breast reduction (B) Procedure Note    Procedure:    Bilateral breast reduction  CPT(R) Code:  88670 - [place] REDUCTION OF LARGE BREAST      Pre-op Diagnosis     * Macromastia [N62]       Post-op Diagnosis     * Macromastia [N62]    Surgeon(s) and Role:     * Subhash Fernandez MD - Primary     * Irineo Rhodes MD - Resident - Assisting    Anesthesia: General    Staff:   Circulator: Julita Melton RN  Scrub Person: Charlene Treviño RN; Tamera Parra    Procedure Details   Bilateral breast reduction with >400g removed from each breast    Estimated Blood Loss: 100 mL    Specimens:                  Order Name Source Comment Collection Info Order Time   PATHOLOGY TISSUE EXAM Breast, Right   Pre-op diagnosis:n62 Collected By: Subhash Fernandez MD 4/1/2019  9:38 AM         Drains:      Implants: * No implants in log *           Complications:  None; patient tolerated the procedure well.           Disposition: PACU - hemodynamically stable.           Condition: stable    Subhash Fernandez MD  Phone Number: [phone]

## 2019-04-02 LAB
CASE RPRT: NORMAL
CLINICAL INFO: NORMAL
PATH REPORT.FINAL DX SPEC: NORMAL
PATH REPORT.GROSS SPEC: NORMAL

## 2019-09-17 ENCOUNTER — OFFICE VISIT (OUTPATIENT)
Dept: GASTROENTEROLOGY | Facility: MEDICAL CENTER | Age: 39
End: 2019-09-17
Payer: COMMERCIAL

## 2019-09-17 VITALS
HEIGHT: 64 IN | TEMPERATURE: 96.6 F | WEIGHT: 153 LBS | DIASTOLIC BLOOD PRESSURE: 70 MMHG | SYSTOLIC BLOOD PRESSURE: 114 MMHG | HEART RATE: 81 BPM | BODY MASS INDEX: 26.12 KG/M2

## 2019-09-17 DIAGNOSIS — R10.13 EPIGASTRIC PAIN: ICD-10-CM

## 2019-09-17 DIAGNOSIS — K21.9 GASTROESOPHAGEAL REFLUX DISEASE, ESOPHAGITIS PRESENCE NOT SPECIFIED: ICD-10-CM

## 2019-09-17 DIAGNOSIS — R19.8 ALTERNATING CONSTIPATION AND DIARRHEA: ICD-10-CM

## 2019-09-17 DIAGNOSIS — K31.84 GASTROPARESIS: Primary | ICD-10-CM

## 2019-09-17 PROCEDURE — 99214 OFFICE O/P EST MOD 30 MIN: CPT | Performed by: PHYSICIAN ASSISTANT

## 2019-09-17 RX ORDER — PANTOPRAZOLE SODIUM 40 MG/1
40 TABLET, DELAYED RELEASE ORAL DAILY
Qty: 90 TABLET | Refills: 2 | Status: SHIPPED | OUTPATIENT
Start: 2019-09-17

## 2019-09-17 RX ORDER — ONDANSETRON 8 MG/1
8 TABLET, ORALLY DISINTEGRATING ORAL EVERY 8 HOURS PRN
Qty: 90 TABLET | Refills: 3 | Status: SHIPPED | OUTPATIENT
Start: 2019-09-17

## 2019-09-17 NOTE — PATIENT INSTRUCTIONS
EGD with Botox scheduled on 11/21/2019 with Dr Sara Rodríguez at the [facility]  Instructions given to patient

## 2019-09-17 NOTE — PROGRESS NOTES
Assessment/Plan:     Diagnoses and all orders for this visit:    Gastroparesis/ epigastric pain/ Gastroesophageal reflux disease, esophagitis presence not specified  She has a history of gastroparesis for which she has tried domperidone and Reglan  The domperidone did work well for her however she has been unable to get recently  It was previously recommended that she have a repeat gastric emptying study which I will reorder at this time  Also to have an upper endoscopy with Botox if her gastric emptying study was positive to help with her symptoms  I will also provide her with a handout with online pharmacy from [facility] to get the domperidone  In the meantime would recommend Zofran as needed to help with symptoms of nausea and vomiting  She denies any significant heartburn or reflux symptoms at this time and has been taking her pantoprazole intermittently  She did have an episode of vomiting while in the office  She states there was some blood streaks mixed with mucus  This is likely from a Yuliana-Kendrick tear versus erosion from gastritis  I told her if this persists she should go to the emergency room for further evaluation and urgent endoscopy  Would recommend restarting pantoprazole daily   -     EGD with Botox Injection; Future  -     NM gastric emptying; Future  -     ondansetron (ZOFRAN-ODT) 8 mg disintegrating tablet; Take 1 tablet (8 mg total) by mouth every 8 (eight) hours as needed for nausea or vomiting  -     pantoprazole (PROTONIX) 40 mg tablet; Take 1 tablet (40 mg total) by mouth daily    Alternating constipation and diarrhea  She states her bowels seem to fluctuate between very hard stools and diarrhea  Would recommend fiber to help with alternating bowel habits  Will see her back after her procedures or sooner if necessary  Subjective:      Patient ID: Odalys Bullard is a 41 y o  female      HPI     This is a follow-up for gastroparesis, epigastric pain, alternating bowel habits  Patient was previously seen in [place] where she had most of her testing done  She then was referred to us 1 year ago  She was on domperidone for several years with good control of her symptoms however was unable to get it when she moved to this area and was started on Reglan  She did develop eye twitching and the medication was discontinued  She is not currently on anything  She does try to eat small frequent meals but continues to have symptoms of nausea, vomiting, heartburn, reflux and abdominal pain  At her last visit it was recommended that she have a repeat gastric emptying study to evaluate the extent of her disease as well as an upper endoscopy with Botox  Unfortunately due to insurance reason she was unable to have either of these tests  She previously was complaining of constipation for which she would take Smooth move tea but she stopped as it was causing diarrhea  She states she typically has a hard stool which then will turn into diarrhea  She is not currently taking anything for her symptoms      Patient Active Problem List   Diagnosis    Gastroparesis    Tongue mass    Mild intermittent asthma without complication    Edema of both ankles    Epigastric pain    Dysfunction of left eustachian tube    Intertrigo    Chest pain varying with breathing    Non-intractable vomiting with nausea    Alternating constipation and diarrhea    GERD (gastroesophageal reflux disease)     No Known Allergies  Current Outpatient Medications on File Prior to Visit   Medication Sig    albuterol (PROAIR HFA) 90 mcg/act inhaler Inhale 2 puffs every 6 (six) hours as needed for wheezing    ketoconazole (NIZORAL) 2 % cream Apply topically daily    Metoclopramide HCl 5 MG TBDP Every 6 hours    [DISCONTINUED] metoclopramide (REGLAN) 5 mg tablet Take 1 tablet (5 mg total) by mouth 4 (four) times a day (Patient not taking: Reported on 12/11/2018 )    [DISCONTINUED] pantoprazole (PROTONIX) 40 mg tablet Take 1 tablet (40 mg total) by mouth daily (Patient not taking: Reported on 9/17/2019)     No current facility-administered medications on file prior to visit        Family History   Problem Relation Age of Onset    Heart disease Mother     Heart disease Father     Diabetes Paternal Grandmother     Diabetes Maternal Uncle      Past Medical History:   Diagnosis Date    Asthma     Chronic idiopathic constipation     Colon polyp     Gastroparesis      Social History     Socioeconomic History    Marital status: /Civil Union     Spouse name: None    Number of children: 3    Years of education: None    Highest education level: None   Occupational History    Occupation: Homemaker     Employer: OTHER     Comment: Self   Social Needs    Financial resource strain: None    Food insecurity:     Worry: None     Inability: None    Transportation needs:     Medical: None     Non-medical: None   Tobacco Use    Smoking status: Never Smoker    Smokeless tobacco: Never Used   Substance and Sexual Activity    Alcohol use: No    Drug use: No    Sexual activity: Yes     Partners: Male     Birth control/protection: Female Sterilization   Lifestyle    Physical activity:     Days per week: None     Minutes per session: None    Stress: None   Relationships    Social connections:     Talks on phone: None     Gets together: None     Attends Mosque service: None     Active member of club or organization: None     Attends meetings of clubs or organizations: None     Relationship status: None    Intimate partner violence:     Fear of current or ex partner: None     Emotionally abused: None     Physically abused: None     Forced sexual activity: None   Other Topics Concern    None   Social History Narrative         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      Past Surgical History:   Procedure Laterality Date    COLONOSCOPY      TUBAL LIGATION  05/13/2012    UPPER GASTROINTESTINAL ENDOSCOPY Review of Systems   All other systems reviewed and are negative  Objective:      /70 (BP Location: Left arm, Patient Position: Sitting, Cuff Size: Adult)   Pulse 81   Temp (!) 96 6 °F (35 9 °C) (Tympanic)   Ht 5' 4" (1 626 m)   Wt 69 4 kg (153 lb)   BMI 26 26 kg/m²          Physical Exam   Constitutional: She is oriented to person, place, and time  She appears well-developed and well-nourished  She appears ill  HENT:   Head: Normocephalic and atraumatic  Eyes: Conjunctivae and EOM are normal    Neck: Normal range of motion  Cardiovascular: Normal rate and regular rhythm  Pulmonary/Chest: Effort normal and breath sounds normal    Abdominal: Soft  Bowel sounds are normal  She exhibits no distension  There is no tenderness  Musculoskeletal: Normal range of motion  Neurological: She is alert and oriented to person, place, and time  Skin: Skin is warm and dry  Psychiatric: She has a normal mood and affect   Her behavior is normal

## 2019-10-17 ENCOUNTER — HOSPITAL ENCOUNTER (OUTPATIENT)
Dept: NUCLEAR MEDICINE | Facility: HOSPITAL | Age: 39
Discharge: HOME | End: 2019-10-17
Payer: COMMERCIAL

## 2019-10-17 DIAGNOSIS — K31.84 GASTROPARESIS: ICD-10-CM

## 2019-10-17 PROCEDURE — A9541 TC99M SULFUR COLLOID: HCPCS

## 2019-10-17 PROCEDURE — 78264 GASTRIC EMPTYING IMG STUDY: CPT

## 2019-10-31 ENCOUNTER — TRANSCRIBE ORDERS (OUTPATIENT)
Dept: GASTROENTEROLOGY | Facility: CLINIC | Age: 39
End: 2019-10-31

## 2019-10-31 ENCOUNTER — TELEPHONE (OUTPATIENT)
Dept: GASTROENTEROLOGY | Facility: CLINIC | Age: 39
End: 2019-10-31

## 2019-10-31 NOTE — TELEPHONE ENCOUNTER
Called [facility] to obtain a prior authorization for patient's Botox    Spoke to Nitish Mohamud who is faxing over the pharmacy prior authorization for Botox authorization as the EGD does not require an auth  Call ref #[id number], 10/31/19 @ 11:45 AM

## 2019-11-07 ENCOUNTER — TRANSCRIBE ORDERS (OUTPATIENT)
Dept: GASTROENTEROLOGY | Facility: CLINIC | Age: 39
End: 2019-11-07

## 2019-11-07 ENCOUNTER — TELEPHONE (OUTPATIENT)
Dept: GASTROENTEROLOGY | Facility: CLINIC | Age: 39
End: 2019-11-07

## 2019-11-11 NOTE — TELEPHONE ENCOUNTER
I called and completed the peer to peer, the physician reviewer will resubmit the case and he states that we will be told the decision within the week  If it is still denied, there is an option for an appeal and the instructions will be on the letter

## 2019-11-14 ENCOUNTER — TRANSCRIBE ORDERS (OUTPATIENT)
Dept: GASTROENTEROLOGY | Facility: CLINIC | Age: 39
End: 2019-11-14

## 2019-11-15 NOTE — TELEPHONE ENCOUNTER
FYI - the EGD w/ botox was denied - I completed the peer to peer and the physician reviewer resubmitted the case but it was denied anyway  We will await the denial letter for appeal instructions

## 2019-11-15 NOTE — TELEPHONE ENCOUNTER
Called and spoke to pt to let her know we have to cancel procedure due to insurance denial  She demonstrated understanding and stated that she is going to call her insurance company to find out why they will not cover it  Susu Asif, and I will email you the denial letter

## 2020-04-27 ENCOUNTER — TELEPHONE (OUTPATIENT)
Dept: GASTROENTEROLOGY | Facility: MEDICAL CENTER | Age: 40
End: 2020-04-27

## 2020-05-04 ENCOUNTER — TELEPHONE (OUTPATIENT)
Dept: GASTROENTEROLOGY | Facility: MEDICAL CENTER | Age: 40
End: 2020-05-04

## 2020-05-04 PROBLEM — K31.84 GASTROPARESIS: Status: RESOLVED | Noted: 2018-04-16 | Resolved: 2020-05-04

## 2020-05-04 PROBLEM — K59.04 CHRONIC IDIOPATHIC CONSTIPATION: Status: ACTIVE | Noted: 2020-05-04

## 2020-05-06 ENCOUNTER — PREP FOR PROCEDURE (OUTPATIENT)
Dept: GASTROENTEROLOGY | Facility: MEDICAL CENTER | Age: 40
End: 2020-05-06

## 2020-05-06 DIAGNOSIS — Z20.822 ENCOUNTER FOR LABORATORY TESTING FOR COVID-19 VIRUS: Primary | ICD-10-CM

## 2020-05-26 ENCOUNTER — TELEPHONE (OUTPATIENT)
Dept: GASTROENTEROLOGY | Facility: CLINIC | Age: 40
End: 2020-05-26

## 2024-10-26 ENCOUNTER — OCCMED (OUTPATIENT)
Dept: URGENT CARE | Facility: CLINIC | Age: 44
End: 2024-10-26

## 2024-10-26 DIAGNOSIS — Z00.00 PHYSICAL EXAM, ANNUAL: Primary | ICD-10-CM

## (undated) DEVICE — BLADE SCALPEL #15

## (undated) DEVICE — GOWN SURGICAL REINFORCED X-LAR

## (undated) DEVICE — BLADE SCALPEL #10

## (undated) DEVICE — PAD GROUND ELECTROSURGICAL W/CORD

## (undated) DEVICE — SUTURE MONOCRYL 4-0 Y426H PS-2 27IN

## (undated) DEVICE — SUTURE VICRYL 3-0 J936H PS-1 UND

## (undated) DEVICE — STAPLER INSORB 25 SUBCUTICULAR SKIN

## (undated) DEVICE — TIP BOVIE BLADE COATED INSULATED 2.75IN

## (undated) DEVICE — SPONGE LAP DISPOSABLE 18X18

## (undated) DEVICE — SUTURE PDS II  0 Z340H CT-1 27IN VIOLET

## (undated) DEVICE — ***USE 138531*** SUTURE VICRYL 2-0 J266H CP-1 UNDYED

## (undated) DEVICE — TUBING SMOKE EVAC PENCIL COATED

## (undated) DEVICE — BANDAGE KERLIX STERILE 4.5INX 4.1YDS

## (undated) DEVICE — GLOVE SZ 8 LINER PROTEXIS PI BL

## (undated) DEVICE — SUTURE PLAIN GUT 5-0   1915G

## (undated) DEVICE — ***USE 57698*** SLEEVE FLOWTRON DVT CALF SINGLE USE

## (undated) DEVICE — COVER LIGHTHANDLE

## (undated) DEVICE — DRAIN PENROSE 1IN

## (undated) DEVICE — ***USE 121391***PACK MAJOR BREAST BMH

## (undated) DEVICE — SUPPORT BREAST LG 36-42

## (undated) DEVICE — MANIFOLD SINGLE PORT NEPTUNE

## (undated) DEVICE — SEPPS BENZOIN TINCTURE

## (undated) DEVICE — BLADE SCALPEL #11

## (undated) DEVICE — APPLICATOR CHLORAPREP 26ML ORANGE TINT

## (undated) DEVICE — GLOVE SURG PROTEXIS PF 7.5

## (undated) DEVICE — TRAP SPECIMEN INLINE NEPTUNE

## (undated) DEVICE — ***USE 138597*** SUTURE SILK  0   678G

## (undated) DEVICE — STERISTRIP 1/2INX4IN